# Patient Record
Sex: FEMALE | Race: BLACK OR AFRICAN AMERICAN | NOT HISPANIC OR LATINO | Employment: UNEMPLOYED | URBAN - METROPOLITAN AREA
[De-identification: names, ages, dates, MRNs, and addresses within clinical notes are randomized per-mention and may not be internally consistent; named-entity substitution may affect disease eponyms.]

---

## 2024-08-09 ENCOUNTER — APPOINTMENT (EMERGENCY)
Dept: CT IMAGING | Facility: HOSPITAL | Age: 64
DRG: 389 | End: 2024-08-09
Payer: COMMERCIAL

## 2024-08-09 ENCOUNTER — HOSPITAL ENCOUNTER (INPATIENT)
Facility: HOSPITAL | Age: 64
LOS: 3 days | Discharge: HOME | DRG: 389 | End: 2024-08-13
Attending: EMERGENCY MEDICINE | Admitting: FAMILY MEDICINE
Payer: COMMERCIAL

## 2024-08-09 DIAGNOSIS — K56.609 SBO (SMALL BOWEL OBSTRUCTION) (HCC): ICD-10-CM

## 2024-08-09 DIAGNOSIS — R11.2 NAUSEA & VOMITING: Primary | ICD-10-CM

## 2024-08-09 DIAGNOSIS — R10.9 ABDOMINAL PAIN: ICD-10-CM

## 2024-08-09 DIAGNOSIS — K56.609 SMALL BOWEL OBSTRUCTION (HCC): ICD-10-CM

## 2024-08-09 LAB
ALBUMIN SERPL BCG-MCNC: 3.7 G/DL (ref 3.5–5)
ALP SERPL-CCNC: 106 U/L (ref 34–104)
ALT SERPL W P-5'-P-CCNC: 21 U/L (ref 7–52)
ANION GAP SERPL CALCULATED.3IONS-SCNC: 13 MMOL/L (ref 4–13)
AST SERPL W P-5'-P-CCNC: 26 U/L (ref 13–39)
BASOPHILS # BLD AUTO: 0.03 THOUSANDS/ÂΜL (ref 0–0.1)
BASOPHILS NFR BLD AUTO: 0 % (ref 0–1)
BILIRUB SERPL-MCNC: 1.48 MG/DL (ref 0.2–1)
BUN SERPL-MCNC: 19 MG/DL (ref 5–25)
CALCIUM SERPL-MCNC: 8.9 MG/DL (ref 8.4–10.2)
CHLORIDE SERPL-SCNC: 102 MMOL/L (ref 96–108)
CO2 SERPL-SCNC: 22 MMOL/L (ref 21–32)
CREAT SERPL-MCNC: 1.11 MG/DL (ref 0.6–1.3)
EOSINOPHIL # BLD AUTO: 0 THOUSAND/ÂΜL (ref 0–0.61)
EOSINOPHIL NFR BLD AUTO: 0 % (ref 0–6)
ERYTHROCYTE [DISTWIDTH] IN BLOOD BY AUTOMATED COUNT: 12.6 % (ref 11.6–15.1)
FLUAV RNA RESP QL NAA+PROBE: NEGATIVE
FLUBV RNA RESP QL NAA+PROBE: NEGATIVE
GFR SERPL CREATININE-BSD FRML MDRD: 52 ML/MIN/1.73SQ M
GLUCOSE SERPL-MCNC: 177 MG/DL (ref 65–140)
HCT VFR BLD AUTO: 52.1 % (ref 34.8–46.1)
HGB BLD-MCNC: 17.2 G/DL (ref 11.5–15.4)
IMM GRANULOCYTES # BLD AUTO: 0.05 THOUSAND/UL (ref 0–0.2)
IMM GRANULOCYTES NFR BLD AUTO: 0 % (ref 0–2)
LACTATE SERPL-SCNC: 1.8 MMOL/L (ref 0.5–2)
LIPASE SERPL-CCNC: 8 U/L (ref 11–82)
LYMPHOCYTES # BLD AUTO: 0.64 THOUSANDS/ÂΜL (ref 0.6–4.47)
LYMPHOCYTES NFR BLD AUTO: 6 % (ref 14–44)
MCH RBC QN AUTO: 30.6 PG (ref 26.8–34.3)
MCHC RBC AUTO-ENTMCNC: 33 G/DL (ref 31.4–37.4)
MCV RBC AUTO: 93 FL (ref 82–98)
MONOCYTES # BLD AUTO: 0.64 THOUSAND/ÂΜL (ref 0.17–1.22)
MONOCYTES NFR BLD AUTO: 6 % (ref 4–12)
NEUTROPHILS # BLD AUTO: 10.24 THOUSANDS/ÂΜL (ref 1.85–7.62)
NEUTS SEG NFR BLD AUTO: 88 % (ref 43–75)
NRBC BLD AUTO-RTO: 0 /100 WBCS
PLATELET # BLD AUTO: 281 THOUSANDS/UL (ref 149–390)
PMV BLD AUTO: 10 FL (ref 8.9–12.7)
POTASSIUM SERPL-SCNC: 4.5 MMOL/L (ref 3.5–5.3)
PROT SERPL-MCNC: 7.9 G/DL (ref 6.4–8.4)
RBC # BLD AUTO: 5.63 MILLION/UL (ref 3.81–5.12)
RSV RNA RESP QL NAA+PROBE: NEGATIVE
SARS-COV-2 RNA RESP QL NAA+PROBE: NEGATIVE
SODIUM SERPL-SCNC: 137 MMOL/L (ref 135–147)
WBC # BLD AUTO: 11.6 THOUSAND/UL (ref 4.31–10.16)

## 2024-08-09 PROCEDURE — 99285 EMERGENCY DEPT VISIT HI MDM: CPT

## 2024-08-09 PROCEDURE — 93005 ELECTROCARDIOGRAM TRACING: CPT

## 2024-08-09 PROCEDURE — 83605 ASSAY OF LACTIC ACID: CPT

## 2024-08-09 PROCEDURE — 96361 HYDRATE IV INFUSION ADD-ON: CPT

## 2024-08-09 PROCEDURE — 80053 COMPREHEN METABOLIC PANEL: CPT | Performed by: EMERGENCY MEDICINE

## 2024-08-09 PROCEDURE — 0241U HB NFCT DS VIR RESP RNA 4 TRGT: CPT

## 2024-08-09 PROCEDURE — 83690 ASSAY OF LIPASE: CPT | Performed by: EMERGENCY MEDICINE

## 2024-08-09 PROCEDURE — 85025 COMPLETE CBC W/AUTO DIFF WBC: CPT | Performed by: EMERGENCY MEDICINE

## 2024-08-09 PROCEDURE — 96376 TX/PRO/DX INJ SAME DRUG ADON: CPT

## 2024-08-09 PROCEDURE — 74177 CT ABD & PELVIS W/CONTRAST: CPT

## 2024-08-09 PROCEDURE — 96374 THER/PROPH/DIAG INJ IV PUSH: CPT

## 2024-08-09 PROCEDURE — 36415 COLL VENOUS BLD VENIPUNCTURE: CPT | Performed by: EMERGENCY MEDICINE

## 2024-08-09 PROCEDURE — 96375 TX/PRO/DX INJ NEW DRUG ADDON: CPT

## 2024-08-09 RX ORDER — ONDANSETRON 2 MG/ML
4 INJECTION INTRAMUSCULAR; INTRAVENOUS ONCE
Status: COMPLETED | OUTPATIENT
Start: 2024-08-09 | End: 2024-08-09

## 2024-08-09 RX ORDER — MORPHINE SULFATE 4 MG/ML
4 INJECTION, SOLUTION INTRAMUSCULAR; INTRAVENOUS ONCE
Status: COMPLETED | OUTPATIENT
Start: 2024-08-09 | End: 2024-08-09

## 2024-08-09 RX ADMIN — MORPHINE SULFATE 4 MG: 4 INJECTION INTRAVENOUS at 23:41

## 2024-08-09 RX ADMIN — SODIUM CHLORIDE 1000 ML: 0.9 INJECTION, SOLUTION INTRAVENOUS at 21:15

## 2024-08-09 RX ADMIN — ONDANSETRON 4 MG: 2 INJECTION INTRAMUSCULAR; INTRAVENOUS at 23:42

## 2024-08-09 RX ADMIN — ONDANSETRON 4 MG: 2 INJECTION INTRAMUSCULAR; INTRAVENOUS at 21:15

## 2024-08-09 RX ADMIN — IOHEXOL 100 ML: 350 INJECTION, SOLUTION INTRAVENOUS at 22:00

## 2024-08-10 PROBLEM — Z86.711 HISTORY OF PULMONARY EMBOLUS (PE): Status: ACTIVE | Noted: 2024-08-10

## 2024-08-10 PROBLEM — K56.609 SBO (SMALL BOWEL OBSTRUCTION) (HCC): Status: ACTIVE | Noted: 2024-08-10

## 2024-08-10 PROBLEM — I10 PRIMARY HYPERTENSION: Status: ACTIVE | Noted: 2024-08-10

## 2024-08-10 LAB
ALBUMIN SERPL BCG-MCNC: 3.1 G/DL (ref 3.5–5)
ALP SERPL-CCNC: 79 U/L (ref 34–104)
ALT SERPL W P-5'-P-CCNC: 15 U/L (ref 7–52)
ANION GAP SERPL CALCULATED.3IONS-SCNC: 8 MMOL/L (ref 4–13)
AST SERPL W P-5'-P-CCNC: 18 U/L (ref 13–39)
ATRIAL RATE: 101 BPM
BACTERIA UR QL AUTO: ABNORMAL /HPF
BILIRUB SERPL-MCNC: 1.2 MG/DL (ref 0.2–1)
BILIRUB UR QL STRIP: NEGATIVE
BUN SERPL-MCNC: 18 MG/DL (ref 5–25)
CALCIUM ALBUM COR SERPL-MCNC: 8.4 MG/DL (ref 8.3–10.1)
CALCIUM SERPL-MCNC: 7.7 MG/DL (ref 8.4–10.2)
CHLORIDE SERPL-SCNC: 107 MMOL/L (ref 96–108)
CLARITY UR: CLEAR
CO2 SERPL-SCNC: 24 MMOL/L (ref 21–32)
COLOR UR: ABNORMAL
CREAT SERPL-MCNC: 0.97 MG/DL (ref 0.6–1.3)
ERYTHROCYTE [DISTWIDTH] IN BLOOD BY AUTOMATED COUNT: 12.7 % (ref 11.6–15.1)
GFR SERPL CREATININE-BSD FRML MDRD: 62 ML/MIN/1.73SQ M
GLUCOSE SERPL-MCNC: 137 MG/DL (ref 65–140)
GLUCOSE UR STRIP-MCNC: NEGATIVE MG/DL
HCT VFR BLD AUTO: 44.6 % (ref 34.8–46.1)
HGB BLD-MCNC: 14 G/DL (ref 11.5–15.4)
HGB UR QL STRIP.AUTO: NEGATIVE
HYALINE CASTS #/AREA URNS LPF: ABNORMAL /LPF
KETONES UR STRIP-MCNC: ABNORMAL MG/DL
LEUKOCYTE ESTERASE UR QL STRIP: NEGATIVE
MAGNESIUM SERPL-MCNC: 1.8 MG/DL (ref 1.9–2.7)
MCH RBC QN AUTO: 30 PG (ref 26.8–34.3)
MCHC RBC AUTO-ENTMCNC: 31.4 G/DL (ref 31.4–37.4)
MCV RBC AUTO: 96 FL (ref 82–98)
MUCOUS THREADS UR QL AUTO: ABNORMAL
NITRITE UR QL STRIP: NEGATIVE
NON-SQ EPI CELLS URNS QL MICRO: ABNORMAL /HPF
P AXIS: 42 DEGREES
PH UR STRIP.AUTO: 6 [PH]
PHOSPHATE SERPL-MCNC: 3.4 MG/DL (ref 2.3–4.1)
PLATELET # BLD AUTO: 240 THOUSANDS/UL (ref 149–390)
PMV BLD AUTO: 10.5 FL (ref 8.9–12.7)
POTASSIUM SERPL-SCNC: 3.8 MMOL/L (ref 3.5–5.3)
PR INTERVAL: 156 MS
PROT SERPL-MCNC: 6.2 G/DL (ref 6.4–8.4)
PROT UR STRIP-MCNC: ABNORMAL MG/DL
QRS AXIS: -39 DEGREES
QRSD INTERVAL: 84 MS
QT INTERVAL: 370 MS
QTC INTERVAL: 479 MS
RBC # BLD AUTO: 4.67 MILLION/UL (ref 3.81–5.12)
RBC #/AREA URNS AUTO: ABNORMAL /HPF
SODIUM SERPL-SCNC: 139 MMOL/L (ref 135–147)
SP GR UR STRIP.AUTO: >=1.05 (ref 1–1.03)
T WAVE AXIS: 10 DEGREES
UROBILINOGEN UR STRIP-ACNC: <2 MG/DL
VENTRICULAR RATE: 101 BPM
WBC # BLD AUTO: 8.28 THOUSAND/UL (ref 4.31–10.16)
WBC #/AREA URNS AUTO: ABNORMAL /HPF

## 2024-08-10 PROCEDURE — 85027 COMPLETE CBC AUTOMATED: CPT | Performed by: FAMILY MEDICINE

## 2024-08-10 PROCEDURE — 83735 ASSAY OF MAGNESIUM: CPT | Performed by: FAMILY MEDICINE

## 2024-08-10 PROCEDURE — 81001 URINALYSIS AUTO W/SCOPE: CPT | Performed by: FAMILY MEDICINE

## 2024-08-10 PROCEDURE — 93010 ELECTROCARDIOGRAM REPORT: CPT | Performed by: INTERNAL MEDICINE

## 2024-08-10 PROCEDURE — 99222 1ST HOSP IP/OBS MODERATE 55: CPT | Performed by: FAMILY MEDICINE

## 2024-08-10 PROCEDURE — 99255 IP/OBS CONSLTJ NEW/EST HI 80: CPT | Performed by: SURGERY

## 2024-08-10 PROCEDURE — 84100 ASSAY OF PHOSPHORUS: CPT | Performed by: FAMILY MEDICINE

## 2024-08-10 PROCEDURE — 80053 COMPREHEN METABOLIC PANEL: CPT | Performed by: FAMILY MEDICINE

## 2024-08-10 RX ORDER — SODIUM CHLORIDE, SODIUM GLUCONATE, SODIUM ACETATE, POTASSIUM CHLORIDE, MAGNESIUM CHLORIDE, SODIUM PHOSPHATE, DIBASIC, AND POTASSIUM PHOSPHATE .53; .5; .37; .037; .03; .012; .00082 G/100ML; G/100ML; G/100ML; G/100ML; G/100ML; G/100ML; G/100ML
125 INJECTION, SOLUTION INTRAVENOUS CONTINUOUS
Status: DISCONTINUED | OUTPATIENT
Start: 2024-08-10 | End: 2024-08-12

## 2024-08-10 RX ORDER — MAGNESIUM SULFATE 1 G/100ML
1 INJECTION INTRAVENOUS ONCE
Status: COMPLETED | OUTPATIENT
Start: 2024-08-10 | End: 2024-08-10

## 2024-08-10 RX ORDER — HEPARIN SODIUM 5000 [USP'U]/ML
5000 INJECTION, SOLUTION INTRAVENOUS; SUBCUTANEOUS EVERY 8 HOURS SCHEDULED
Status: DISCONTINUED | OUTPATIENT
Start: 2024-08-10 | End: 2024-08-13 | Stop reason: HOSPADM

## 2024-08-10 RX ORDER — WARFARIN SODIUM 2.5 MG/1
7.5 TABLET ORAL
COMMUNITY
End: 2024-08-10 | Stop reason: ALTCHOICE

## 2024-08-10 RX ORDER — ONDANSETRON 2 MG/ML
4 INJECTION INTRAMUSCULAR; INTRAVENOUS EVERY 6 HOURS PRN
Status: DISCONTINUED | OUTPATIENT
Start: 2024-08-10 | End: 2024-08-13 | Stop reason: HOSPADM

## 2024-08-10 RX ORDER — MORPHINE SULFATE 4 MG/ML
4 INJECTION, SOLUTION INTRAMUSCULAR; INTRAVENOUS EVERY 4 HOURS PRN
Status: DISCONTINUED | OUTPATIENT
Start: 2024-08-10 | End: 2024-08-11

## 2024-08-10 RX ORDER — METOPROLOL TARTRATE 1 MG/ML
5 INJECTION, SOLUTION INTRAVENOUS EVERY 12 HOURS
Status: DISCONTINUED | OUTPATIENT
Start: 2024-08-10 | End: 2024-08-12

## 2024-08-10 RX ORDER — METOPROLOL TARTRATE 25 MG/1
12.5 TABLET, FILM COATED ORAL 2 TIMES DAILY
COMMUNITY

## 2024-08-10 RX ADMIN — HEPARIN SODIUM 5000 UNITS: 5000 INJECTION INTRAVENOUS; SUBCUTANEOUS at 18:12

## 2024-08-10 RX ADMIN — SODIUM CHLORIDE, SODIUM GLUCONATE, SODIUM ACETATE, POTASSIUM CHLORIDE AND MAGNESIUM CHLORIDE 125 ML/HR: 526; 502; 368; 37; 30 INJECTION, SOLUTION INTRAVENOUS at 02:11

## 2024-08-10 RX ADMIN — SODIUM CHLORIDE, SODIUM GLUCONATE, SODIUM ACETATE, POTASSIUM CHLORIDE AND MAGNESIUM CHLORIDE 125 ML/HR: 526; 502; 368; 37; 30 INJECTION, SOLUTION INTRAVENOUS at 09:44

## 2024-08-10 RX ADMIN — MAGNESIUM SULFATE HEPTAHYDRATE 1 G: 1 INJECTION, SOLUTION INTRAVENOUS at 09:40

## 2024-08-10 RX ADMIN — METOROPROLOL TARTRATE 5 MG: 5 INJECTION, SOLUTION INTRAVENOUS at 12:21

## 2024-08-10 RX ADMIN — HEPARIN SODIUM 5000 UNITS: 5000 INJECTION INTRAVENOUS; SUBCUTANEOUS at 02:11

## 2024-08-10 RX ADMIN — METOROPROLOL TARTRATE 5 MG: 5 INJECTION, SOLUTION INTRAVENOUS at 01:15

## 2024-08-10 RX ADMIN — SODIUM CHLORIDE 1000 ML: 0.9 INJECTION, SOLUTION INTRAVENOUS at 00:33

## 2024-08-10 RX ADMIN — MORPHINE SULFATE 4 MG: 4 INJECTION INTRAVENOUS at 04:20

## 2024-08-10 RX ADMIN — HEPARIN SODIUM 5000 UNITS: 5000 INJECTION INTRAVENOUS; SUBCUTANEOUS at 09:48

## 2024-08-10 NOTE — ASSESSMENT & PLAN NOTE
S/p surgery    From 22 from Gyn Oncology  62 y.o.  female who was diagnosed with a Stage IIC left ovarian carcinosarcoma s/p ELAP, modified posterior exenteration and tumor debulking along left pelvic sidewall, bladder dome, peritoneal stripping, left pelvic lymph node dissection and debulking, radical omentectomy, appy, loop ileostomy with Jocelyne Sweeney and Linnea on 2015.     The patient received only 3 cycles of combination of carboplatin and paclitaxel, which was completed on 2015. She was lost to follow up and had her ileostomy reversed in 2016.

## 2024-08-10 NOTE — TREATMENT TEAM
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 emergency room with complaining of abdominal pain, nausea, vomiting that started Saturday morning.  Patient reports that she started having some abdominal discomfort after having supper on Friday.  Reports that she had 25+ episodes of nonbloody emesis since Saturday.  On further evaluation noted to have finding concerning of small bowel pressure on CT scan and patient could not tolerate NG tube attempts x 2 in the ER.  Currently on my encounter patient appears comfortable and not in distress.  Currently patient is still reporting episodes of nausea without vomiting.  Does report last bowel movement on Saturday.  Does report passing flatus.  Currently she denies any other new complaints.    CBC within normal limits.  CMP noted with elevated T. bili 1.20, magnesium 1.8.  Lactic acid normal.  UA negative for UTI.  CT abdomen pelvis reported with multiple abnormally dilated loops, finding concerning of small bowel position likely high-grade.    Obese female patient, acutely nontoxic appearing.  She is awake, alert, oriented x 3.  Normal S1-S2, no JVD.  Lung sounds without wheezing or crackles.  Hyperactive bowel sounds.  Abdomen distended, mild diffuse tenderness noted on exam without rigidity.    Currently she is acutely nontoxic-appearing.  Hemodynamically stable.  Continue n.p.o., bowel rest.  Continue with IV fluids, IV antiemetics as needed, analgesics as needed with cautions.  Encourage ambulation and incentive spirometry use.  Discussed with patient at length that she may need NG tube if symptoms worsens.  Awaiting further recommendation from surgery team.  Supplement electrolytes as needed.

## 2024-08-10 NOTE — ASSESSMENT & PLAN NOTE
CT scan showing sbo, likely high grade    Consult General Surgery  NPO  IV Fluids   Labs in AM  NG tube

## 2024-08-10 NOTE — H&P
[facility]  H&P  Name: Angelita Lam 64 y.o. female I MRN: [id number]  Unit/Bed#: ED 14 I Date of Admission: 2024   Date of Service: 8/10/2024 I Hospital Day: 0      Assessment & Plan   * SBO (small bowel obstruction) (HCC)  Assessment & Plan  CT scan showing sbo, likely high grade    Consult General Surgery  NPO  IV Fluids   Labs in AM  NG tube    Malignant neoplasm of left ovary (HCC)  Assessment & Plan  S/p surgery    From 22 from Gyn Oncology  62 y.o.  female who was diagnosed with a Stage IIC left ovarian carcinosarcoma s/p ELAP, modified posterior exenteration and tumor debulking along left pelvic sidewall, bladder dome, peritoneal stripping, left pelvic lymph node dissection and debulking, radical omentectomy, appy, loop ileostomy with Jocelyne Sweeney and Linnea on 2015.     The patient received only 3 cycles of combination of carboplatin and paclitaxel, which was completed on 2015. She was lost to follow up and had her ileostomy reversed in 2016.     Primary hypertension  Assessment & Plan  Home regimen is Lopressor 12.5 mg p.o. twice daily, which will switch over to IV twice daily           Disposition  #1  Consult general surgery  #2  N.p.o. and NG tube  #3  IV pain medication  #4  Labs in a.m.        VTE Prophylaxis: Heparin  / sequential compression device   Code Status: Level 1 - Full Code       Anticipated Length of Stay:  Patient will be admitted on an Inpatient basis with an anticipated length of stay of greater than 2 midnights.   Justification for Hospital Stay: Please see detailed plans noted above.    Chief Complaint:     Abdominal pain  History of Present Illness:  Angelita Lam is a 64 y.o. female who has past medical history significant for ovarian cancer status post surgery back in , hypertension comes in for nausea vomiting and onset of abdominal pain that happened yesterday.  The pain continued getting worse and continued having  worsening burping episodes.  She is not passing any flatus.  She was urged by her significant other to come to the hospital.  Every time she tried to eat or drink she would throw up.      Review of Systems:    Constitutional:  Denies fever or chills   Eyes:  Denies change in visual acuity   HENT:  Denies nasal congestion or sore throat   Respiratory:  Denies cough or shortness of breath   Cardiovascular:  Denies chest pain or edema   GI: Nausea, vomiting, abdominal pain  :  Denies dysuria   Musculoskeletal:  Denies back pain or joint pain   Integument:  Denies rash   Neurologic:  Denies headache or sensory changes   Endocrine:  Denies polyuria or polydipsia   Lymphatic:  Denies swollen glands   Psychiatric:  Denies depression or anxiety     Past Medical and Surgical History:   Past Medical History:   Diagnosis Date    Ovarian cancer (HCC)      History reviewed. No pertinent surgical history.    Meds/Allergies:  No current facility-administered medications on file prior to encounter.     Current Outpatient Medications on File Prior to Encounter   Medication Sig Dispense Refill    metoprolol tartrate (LOPRESSOR) 25 mg tablet Take 12.5 mg by mouth 2 (two) times a day      [DISCONTINUED] warfarin (COUMADIN) 2.5 mg tablet Take 7.5 mg by mouth daily             Allergies:   Allergies   Allergen Reactions    Bee Venom Other (See Comments)     fever, spasms, pain       History:  Marital Status: /Civil Union     Substance Use History:   Social History     Substance and Sexual Activity   Alcohol Use Not Currently     Social History     Tobacco Use   Smoking Status Never   Smokeless Tobacco Never     Social History     Substance and Sexual Activity   Drug Use Not Currently       Family History:  History reviewed. No pertinent family history.    Physical Exam:     Vitals:   Blood Pressure: (!) 140/101 (Simultaneous filing. User may not have seen previous data.) (08/10/24 0035)  Pulse: 105 (Simultaneous filing. User may  not have seen previous data.) (08/10/24 0035)  Temperature: 98.3 °F (36.8 °C) (08/09/24 2045)  Temp Source: Oral (08/09/24 2045)  Respirations: 16 (Simultaneous filing. User may not have seen previous data.) (08/10/24 0035)  Weight - Scale: 90.7 kg (200 lb) (08/09/24 2046)  SpO2: 95 % (Simultaneous filing. User may not have seen previous data.) (08/10/24 0035)    Constitutional:  Non-toxic appearance  Eyes:  EOMI, No scleral icterus   HENT:   oropharynx moist, external ears normal, external nose normal   Respiratory:  No respiratory distress, no wheezing   Cardiovascular: Tachycardic  GI:  Soft, distended, some tenderness upon palpation  :  No costovertebral angle tenderness   Musculoskeletal:  no tenderness, no deformities.   Integument:  no jaundice, no rash   Neurologic:  Alert &awake, communicative, CN 2-12 normal,  no focal deficits noted   Psychiatric:  Speech and behavior appropriate       Lab Results: I have personally reviewed pertinent reports.      Results from last 7 days   Lab Units 08/09/24  2114   WBC Thousand/uL 11.60*   HEMOGLOBIN g/dL 17.2*   HEMATOCRIT % 52.1*   PLATELETS Thousands/uL 281   SEGS PCT % 88*   LYMPHO PCT % 6*   MONO PCT % 6   EOS PCT % 0     Results from last 7 days   Lab Units 08/09/24  2114   POTASSIUM mmol/L 4.5   CHLORIDE mmol/L 102   CO2 mmol/L 22   BUN mg/dL 19   CREATININE mg/dL 1.11   CALCIUM mg/dL 8.9   ALK PHOS U/L 106*   ALT U/L 21   AST U/L 26               Imaging: I have personally reviewed pertinent reports.      CT abdomen pelvis with contrast    Result Date: 8/9/2024  Narrative: CT ABDOMEN AND PELVIS WITH IV CONTRAST INDICATION: abdominal pain and vomiting. COMPARISON: None. TECHNIQUE: CT examination of the abdomen and pelvis was performed. Multiplanar 2D reformatted images were created from the source data. This examination, like all CT scans performed in the Critical access hospital, was performed utilizing techniques to minimize radiation dose exposure,  including the use of iterative reconstruction and automated exposure control. Radiation dose length product (DLP) for this visit: 731 mGy-cm IV Contrast: 100 mL of iohexol (OMNIPAQUE) Enteric Contrast: Not administered. FINDINGS: ABDOMEN LOWER CHEST: There is trace right pleural fluid. There is a small hiatal hernia. LIVER/BILIARY TREE: Hepatic steatosis. No suspicious mass. Normal hepatic contours. No biliary dilation. GALLBLADDER: No calcified gallstones. No pericholecystic inflammatory change. SPLEEN: Unremarkable. PANCREAS: Unremarkable. ADRENAL GLANDS: Unremarkable. KIDNEYS/URETERS: No hydronephrosis or urinary tract calculi. Subcentimeter hypoattenuating renal lesion(s), too small to characterize but statistically likely benign, which do not warrant follow-up (Radiology June 2019). STOMACH AND BOWEL: Anastomotic suture material is noted at the level of the rectosigmoid junction. There are multiple loops of abnormally dilated loops of proximal to mid small bowel with relative collapse of the distal small bowel and colon. A few loops of small bowel within the lower abdomen and pelvis demonstrate fecalization of its intraluminal contents as well as circumferential wall thickening. The findings are most compatible with a small bowel obstruction, likely high-grade. A transition point appears to be within the left lower quadrant/pelvic region. APPENDIX: No findings to suggest appendicitis. ABDOMINOPELVIC CAVITY: There is a small to moderate amount of intra-abdominal ascites. No pneumoperitoneum. No lymphadenopathy. VESSELS: Unremarkable for patient's age. An infrarenal IVC filter is noted. PELVIS REPRODUCTIVE ORGANS: Unremarkable for patient's age. URINARY BLADDER: There is mild diffuse urinary bladder wall thickening with surrounding phonatory stranding suspicious for an acute cystitis/UTI. ABDOMINAL WALL/INGUINAL REGIONS: Unremarkable. BONES: No acute fracture or suspicious osseous lesion. Spinal degenerative  changes.     Impression: Multiple abnormally dilated loops of proximal to mid small bowel with relative collapse of the distal small bowel compatible with a small bowel obstruction, likely high-grade. A few of these loops dilated small bowel demonstrate circumferential wall thickening with surrounding mesenteric edema for which early ischemia cannot be excluded. Small to moderate amount of intra-abdominal ascites is also present. Surgical consultation is advised. No free intraperitoneal air. Mild perivesicular inflammatory stranding suspicious for an acute cystitis/UTI. Correlation with a urinalysis is recommended. I personally discussed this study with CRYS RAMIREZ on 8/9/2024 10:21 PM. Workstation performed: [id number]         Medical decision making: Moderate  Diagnosis addressed: 1 acute complicated medical problem  Data:   Reviewed  CBC, CMP, lactic acid, lipase, viral panel  Ordered CBC, BMP, magnesium and phosphorus  Reviewed external notes from gynecology oncology  Independent interpretation of testing EKG/telemetry: Interpreted myself which shows sinus tachycardia  Discussion of management with ER provider: NG tube, IV fluids n.p.o.           Risk:  Prescription drug management: IV fluids, IV pain medication  Discussion for hospitalization with ER provider: Requires hospitalization for small bowel structure and possibly needing surgery                     Epic Records Reviewed as well as Records in Care Everywhere    ** Please Note: Dragon 360 Dictation voice to text software was used in the creation of this document. **

## 2024-08-10 NOTE — ED PROVIDER NOTES
History  Chief Complaint   Patient presents with    Vomiting     Pt got sick last night after dinner and hasn't been able to stop vomiting since then. Pt states she never checked her temperature but she had hot and cold flashes all night      The patient is a 64 y.o. female with a history of ovarian cancer who presents to [facility] with a chief complaint of vomiting. Symptoms began last night and have been constant since onset.  Her pain is currently rated as a 4/10 in severity and described as discomfort without radiation. Associated symptoms include chills. Symptoms are aggravated with oral intake and alleviating factors include none noted. The patient denies fever, chest pain, cough, sputum, wheezing, dizziness, headedness, syncope, seizures, falls, trauma, hemoptysis, hematemesis, hematochezia, diarrhea, melena, dysuria, hematuria, frequency or urgency. No other reported symptoms at this time.  Patient affirms allergies to bee venom          History provided by:  Patient   used: No    Vomiting  Associated symptoms: abdominal pain and chills    Associated symptoms: no arthralgias, no cough, no fever and no sore throat        Prior to Admission Medications   Prescriptions Last Dose Informant Patient Reported? Taking?   metoprolol tartrate (LOPRESSOR) 25 mg tablet Past Week  Yes Yes   Sig: Take 12.5 mg by mouth 2 (two) times a day      Facility-Administered Medications: None       Past Medical History:   Diagnosis Date    Ovarian cancer (HCC)        History reviewed. No pertinent surgical history.    History reviewed. No pertinent family history.  I have reviewed and agree with the history as documented.    E-Cigarette/Vaping     E-Cigarette/Vaping Substances     Social History     Tobacco Use    Smoking status: Never    Smokeless tobacco: Never   Substance Use Topics    Alcohol use: Not Currently    Drug use: Not Currently       Review of Systems   Constitutional:  Positive  for chills. Negative for fever.   HENT:  Negative for ear pain and sore throat.    Eyes:  Negative for pain and visual disturbance.   Respiratory:  Negative for cough and shortness of breath.    Cardiovascular:  Negative for chest pain and palpitations.   Gastrointestinal:  Positive for abdominal pain and vomiting.   Genitourinary:  Negative for dysuria and hematuria.   Musculoskeletal:  Negative for arthralgias and back pain.   Skin:  Negative for color change and rash.   Neurological:  Negative for seizures and syncope.   All other systems reviewed and are negative.      Physical Exam  Physical Exam  Vitals reviewed.   Constitutional:       General: She is not in acute distress.     Appearance: She is not ill-appearing.   HENT:      Head: Normocephalic and atraumatic.      Nose: Nose normal.      Mouth/Throat:      Mouth: Mucous membranes are moist.      Pharynx: Oropharynx is clear. No oropharyngeal exudate.   Eyes:      General: No scleral icterus.     Conjunctiva/sclera: Conjunctivae normal.   Cardiovascular:      Rate and Rhythm: Tachycardia present.      Pulses: Normal pulses.   Pulmonary:      Effort: Pulmonary effort is normal. No respiratory distress.      Breath sounds: Normal breath sounds. No stridor. No wheezing, rhonchi or rales.   Abdominal:      General: Abdomen is flat. Bowel sounds are normal.      Palpations: Abdomen is soft.      Tenderness: There is abdominal tenderness. There is no right CVA tenderness, left CVA tenderness, guarding or rebound.   Musculoskeletal:         General: No deformity or signs of injury. Normal range of motion.      Cervical back: Normal range of motion and neck supple.      Right lower leg: No edema.      Left lower leg: No edema.   Skin:     General: Skin is warm and dry.      Capillary Refill: Capillary refill takes less than 2 seconds.      Coloration: Skin is not jaundiced or pale.      Findings: No bruising, erythema or lesion.   Neurological:      Mental Status:  She is alert and oriented to person, place, and time. Mental status is at baseline.         Vital Signs  ED Triage Vitals   Temperature Pulse Respirations Blood Pressure SpO2   08/09/24 2045 08/09/24 2045 08/09/24 2045 08/09/24 2045 08/09/24 2045   98.3 °F (36.8 °C) (!) 128 20 166/95 98 %      Temp Source Heart Rate Source Patient Position - Orthostatic VS BP Location FiO2 (%)   08/09/24 2045 08/09/24 2045 08/09/24 2045 08/09/24 2045 --   Oral Monitor Sitting Left arm       Pain Score       08/09/24 2341       10 - Worst Possible Pain           Vitals:    08/10/24 0035 08/10/24 0114 08/10/24 0135 08/10/24 0139   BP: (!) 140/101 163/89 150/96    Pulse: 105 96  85   Patient Position - Orthostatic VS: Lying Sitting  Lying         Visual Acuity      ED Medications  Medications   multi-electrolyte (PLASMALYTE-A/ISOLYTE-S PH 7.4) IV solution (125 mL/hr Intravenous New Bag 8/10/24 0211)   ondansetron (ZOFRAN) injection 4 mg (has no administration in time range)   heparin (porcine) subcutaneous injection 5,000 Units (5,000 Units Subcutaneous Given 8/10/24 0211)   morphine injection 2 mg (has no administration in time range)   morphine injection 4 mg (4 mg Intravenous Given 8/10/24 0420)   metoprolol (LOPRESSOR) injection 5 mg (5 mg Intravenous Given 8/10/24 0115)   ondansetron (ZOFRAN) injection 4 mg (4 mg Intravenous Given 8/9/24 2115)   sodium chloride 0.9 % bolus 1,000 mL (0 mL Intravenous Stopped 8/9/24 2235)   iohexol (OMNIPAQUE) 350 MG/ML injection (MULTI-DOSE) 100 mL (100 mL Intravenous Given 8/9/24 2200)   ondansetron (ZOFRAN) injection 4 mg (4 mg Intravenous Given 8/9/24 2342)   morphine injection 4 mg (4 mg Intravenous Given 8/9/24 2341)   sodium chloride 0.9 % bolus 1,000 mL (1,000 mL Intravenous New Bag 8/10/24 0033)       Diagnostic Studies  Results Reviewed       Procedure Component Value Units Date/Time    UA w Reflex to Microscopic w Reflex to Culture [[id number]]  (Abnormal) Collected: 08/10/24 0334    Lab  Status: Final result Specimen: Urine, Other Updated: 08/10/24 0423     Color, UA Light Orange     Clarity, UA Clear     Specific Gravity, UA >=1.050     pH, UA 6.0     Leukocytes, UA Negative     Nitrite, UA Negative     Protein, UA 30 (1+) mg/dl      Glucose, UA Negative mg/dl      Ketones, UA Trace mg/dl      Urobilinogen, UA <2.0 mg/dl      Bilirubin, UA Negative     Occult Blood, UA Negative    Comprehensive metabolic panel [[id number]]     Lab Status: No result Specimen: Blood     Magnesium [[id number]]     Lab Status: No result Specimen: Blood     Phosphorus [[id number]]     Lab Status: No result Specimen: Blood     CBC (With Platelets) [[id number]]     Lab Status: No result Specimen: Blood     Lactic acid, plasma (w/reflex if result > 2.0) [[id number]]  (Normal) Collected: 08/09/24 2241    Lab Status: Final result Specimen: Blood from Arm, Left Updated: 08/09/24 2310     LACTIC ACID 1.8 mmol/L     Narrative:      Result may be elevated if tourniquet was used during collection.    FLU/RSV/COVID - if FLU/RSV clinically relevant [[id number]]  (Normal) Collected: 08/09/24 2151    Lab Status: Final result Specimen: Nares from Nose Updated: 08/09/24 2239     SARS-CoV-2 Negative     INFLUENZA A PCR Negative     INFLUENZA B PCR Negative     RSV PCR Negative    Narrative:      FOR PEDIATRIC PATIENTS - copy/paste COVID Guidelines URL to browser: [email or web address]    SARS-CoV-2 assay is a Nucleic Acid Amplification assay intended for the  qualitative detection of nucleic acid from SARS-CoV-2 in nasopharyngeal  swabs. Results are for the presumptive identification of SARS-CoV-2 RNA.    Positive results are indicative of infection with SARS-CoV-2, the virus  causing COVID-19, but do not rule out bacterial infection or co-infection  with other viruses. Laboratories within the [place] and its  territories are required to report all positive results to the  appropriate  public health authorities. Negative results do not preclude SARS-CoV-2  infection and should not be used as the sole basis for treatment or other  patient management decisions. Negative results must be combined with  clinical observations, patient history, and epidemiological information.  This test has not been FDA cleared or approved.    This test has been authorized by FDA under an Emergency Use Authorization  (EUA). This test is only authorized for the duration of time the  declaration that circumstances exist justifying the authorization of the  emergency use of an in vitro diagnostic tests for detection of SARS-CoV-2  virus and/or diagnosis of COVID-19 infection under section 564(b)(1) of  the Act, 21 U.S.C. 360bbb-3(b)(1), unless the authorization is terminated  or revoked sooner. The test has been validated but independent review by FDA  and CLIA is pending.    Test performed using Cepheid GeneXpert: This RT-PCR assay targets N2,  a region unique to SARS-CoV-2. A conserved region in the E-gene was chosen  for pan-Sarbecovirus detection which includes SARS-CoV-2.    According to CMS-2020-01-R, this platform meets the definition of high-throughput technology.    Comprehensive metabolic panel [[id number]]  (Abnormal) Collected: 08/09/24 2114    Lab Status: Final result Specimen: Blood from Arm, Left Updated: 08/09/24 2136     Sodium 137 mmol/L      Potassium 4.5 mmol/L      Chloride 102 mmol/L      CO2 22 mmol/L      ANION GAP 13 mmol/L      BUN 19 mg/dL      Creatinine 1.11 mg/dL      Glucose 177 mg/dL      Calcium 8.9 mg/dL      AST 26 U/L      ALT 21 U/L      Alkaline Phosphatase 106 U/L      Total Protein 7.9 g/dL      Albumin 3.7 g/dL      Total Bilirubin 1.48 mg/dL      eGFR 52 ml/min/1.73sq m     Narrative:      National Kidney Disease Foundation guidelines for Chronic Kidney Disease (CKD):     Stage 1 with normal or high GFR (GFR > 90 mL/min/1.73 square meters)    Stage 2 Mild CKD (GFR =  60-89 mL/min/1.73 square meters)    Stage 3A Moderate CKD (GFR = 45-59 mL/min/1.73 square meters)    Stage 3B Moderate CKD (GFR = 30-44 mL/min/1.73 square meters)    Stage 4 Severe CKD (GFR = 15-29 mL/min/1.73 square meters)    Stage 5 End Stage CKD (GFR <15 mL/min/1.73 square meters)  Note: GFR calculation is accurate only with a steady state creatinine    Lipase [[id number]]  (Abnormal) Collected: 08/09/24 2114    Lab Status: Final result Specimen: Blood from Arm, Left Updated: 08/09/24 2136     Lipase 8 u/L     CBC and differential [[id number]]  (Abnormal) Collected: 08/09/24 2114    Lab Status: Final result Specimen: Blood from Arm, Left Updated: 08/09/24 2122     WBC 11.60 Thousand/uL      RBC 5.63 Million/uL      Hemoglobin 17.2 g/dL      Hematocrit 52.1 %      MCV 93 fL      MCH 30.6 pg      MCHC 33.0 g/dL      RDW 12.6 %      MPV 10.0 fL      Platelets 281 Thousands/uL      nRBC 0 /100 WBCs      Segmented % 88 %      Immature Grans % 0 %      Lymphocytes % 6 %      Monocytes % 6 %      Eosinophils Relative 0 %      Basophils Relative 0 %      Absolute Neutrophils 10.24 Thousands/µL      Absolute Immature Grans 0.05 Thousand/uL      Absolute Lymphocytes 0.64 Thousands/µL      Absolute Monocytes 0.64 Thousand/µL      Eosinophils Absolute 0.00 Thousand/µL      Basophils Absolute 0.03 Thousands/µL                    CT abdomen pelvis with contrast   Final Result by Guy Mckinley MD (08/09 2236)      Multiple abnormally dilated loops of proximal to mid small bowel with relative collapse of the distal small bowel compatible with a small bowel obstruction, likely high-grade. A few of these loops dilated small bowel demonstrate circumferential wall    thickening with surrounding mesenteric edema for which early ischemia cannot be excluded. Small to moderate amount of intra-abdominal ascites is also present. Surgical consultation is advised.      No free intraperitoneal air.      Mild perivesicular inflammatory  stranding suspicious for an acute cystitis/UTI. Correlation with a urinalysis is recommended.               I personally discussed this study with CRYS RAMIREZ on 8/9/2024 10:21 PM.            Workstation performed: [id number]                    Procedures  Procedures         ED Course  ED Course as of 08/10/24 0425   Fri Aug 09, 2024   2239 CT abdomen pelvis with contrast  Multiple abnormally dilated loops of proximal to mid small bowel with relative collapse of the distal small bowel compatible with a small bowel obstruction, likely high-grade. A few of these loops dilated small bowel demonstrate circumferential wall   thickening with surrounding mesenteric edema for which early ischemia cannot be excluded. Small to moderate amount of intra-abdominal ascites is also present. Surgical consultation is advised.     No free intraperitoneal air.     Mild perivesicular inflammatory stranding suspicious for an acute cystitis/UTI. Correlation with a urinalysis is recommended.     2312 LACTIC ACID: 1.8                                 SBIRT 22yo+      Flowsheet Row Most Recent Value   Initial Alcohol Screen: US AUDIT-C     1. How often do you have a drink containing alcohol? 0 Filed at: 08/09/2024 2046   2. How many drinks containing alcohol do you have on a typical day you are drinking?  0 Filed at: 08/09/2024 2046   3b. FEMALE Any Age, or MALE 65+: How often do you have 4 or more drinks on one occassion? 0 Filed at: 08/09/2024 2046   Audit-C Score 0 Filed at: 08/09/2024 2046   WILLEM: How many times in the past year have you...    Used an illegal drug or used a prescription medication for non-medical reasons? Never Filed at: 08/09/2024 2046                      Medical Decision Making  The patient is a 64 y.o. female with a history of ovarian cancer who presents to [facility] with a chief complaint of vomiting.   Patient reports that the last meal she had before getting sick was chinese food. Patient  denies any sick contacts.  Abdominal exam without peritoneal signs. No evidence of acute abdomen at this time. Well appearing. Given work up, low suspicion for acute hepatobiliary disease (including acute cholecystitis or cholangitis), acute pancreatitis (neg lipase), PUD (including gastric perforation), acute infectious processes (pneumonia, hepatitis, pyelonephritis), acute appendicitis, vascular catastrophe, bowel obstruction, viscus perforation, or genital torsion, diverticulitis.   CT abdomen and pelvis showed Multiple abnormally dilated loops of proximal to mid small bowel with relative collapse of the distal small bowel compatible with a small bowel obstruction, likely high-grade. A few of these loops dilated small bowel demonstrate circumferential wall   thickening with surrounding mesenteric edema for which early ischemia cannot be excluded. Small to moderate amount of intra-abdominal ascites is also present. Surgical consultation is advised.  No free intraperitoneal air.  Mild perivesicular inflammatory stranding suspicious for an acute cystitis/UTI. Correlation with a urinalysis is recommended.  Discussed with surgery on call who recommended NPO, NG tube, and SLIM admission with surgery consult. Patient understands and agrees with     Problems Addressed:  Abdominal pain: acute illness or injury  Nausea & vomiting: acute illness or injury  Small bowel obstruction (HCC): acute illness or injury    Amount and/or Complexity of Data Reviewed  Labs: ordered. Decision-making details documented in ED Course.  Radiology: ordered. Decision-making details documented in ED Course.    Risk  Prescription drug management.  Decision regarding hospitalization.                 Disposition  Final diagnoses:   Nausea & vomiting   Small bowel obstruction (HCC)   Abdominal pain     Time reflects when diagnosis was documented in both MDM as applicable and the Disposition within this note       Time User Action Codes Description  Comment    8/9/2024 11:37 PM Josh Ocampo Add [R11.2] Nausea & vomiting     8/9/2024 11:37 PM Josh Ocampo Add [K56.609] Small bowel obstruction (HCC)     8/9/2024 11:38 PM Josh Ocampo Add [R10.9] Abdominal pain     8/10/2024  1:02 AM Osman Noel Add [K56.609] SBO (small bowel obstruction) (HCC)           ED Disposition       ED Disposition   Admit    Condition   Stable    Date/Time   Sat Aug 10, 2024 0018    Comment   Case was discussed with INDRA and the patient's admission status was agreed to be Admission Status: inpatient status to the service of Dr. Noel .               Follow-up Information    None         Current Discharge Medication List        CONTINUE these medications which have NOT CHANGED    Details   metoprolol tartrate (LOPRESSOR) 25 mg tablet Take 12.5 mg by mouth 2 (two) times a day             No discharge procedures on file.    PDMP Review       None            ED Provider  Electronically Signed by             Josh Ocampo PA-C  08/10/24 0425

## 2024-08-10 NOTE — ED NOTES
Attempted to insert NGT, pt. Was unable to tolerate procedure. ABIGAIL Moreno made aware.      Esther Colbert RN  08/10/24 0102

## 2024-08-10 NOTE — PLAN OF CARE
Problem: PAIN - ADULT  Goal: Verbalizes/displays adequate comfort level or baseline comfort level  Description: Interventions:  - Encourage patient to monitor pain and request assistance  - Assess pain using appropriate pain scale  - Administer analgesics based on type and severity of pain and evaluate response  - Implement non-pharmacological measures as appropriate and evaluate response  - Consider cultural and social influences on pain and pain management  - Notify physician/advanced practitioner if interventions unsuccessful or patient reports new pain  Outcome: Progressing     Problem: INFECTION - ADULT  Goal: Absence or prevention of progression during hospitalization  Description: INTERVENTIONS:  - Assess and monitor for signs and symptoms of infection  - Monitor lab/diagnostic results  - Monitor all insertion sites, i.e. indwelling lines, tubes, and drains  - Monitor endotracheal if appropriate and nasal secretions for changes in amount and color  - Kermit appropriate cooling/warming therapies per order  - Administer medications as ordered  - Instruct and encourage patient and family to use good hand hygiene technique  - Identify and instruct in appropriate isolation precautions for identified infection/condition  Outcome: Progressing  Goal: Absence of fever/infection during neutropenic period  Description: INTERVENTIONS:  - Monitor WBC    Outcome: Progressing     Problem: SAFETY ADULT  Goal: Patient will remain free of falls  Description: INTERVENTIONS:  - Educate patient/family on patient safety including physical limitations  - Instruct patient to call for assistance with activity   - Consult OT/PT to assist with strengthening/mobility   - Keep Call bell within reach  - Keep bed low and locked with side rails adjusted as appropriate  - Keep care items and personal belongings within reach  - Initiate and maintain comfort rounds  - Make Fall Risk Sign visible to staff  - Apply yellow socks and bracelet  for high fall risk patients  - Consider moving patient to room near nurses station  Outcome: Progressing  Goal: Maintain or return to baseline ADL function  Description: INTERVENTIONS:  -  Assess patient's ability to carry out ADLs; assess patient's baseline for ADL function and identify physical deficits which impact ability to perform ADLs (bathing, care of mouth/teeth, toileting, grooming, dressing, etc.)  - Assess/evaluate cause of self-care deficits   - Assess range of motion  - Assess patient's mobility; develop plan if impaired  - Assess patient's need for assistive devices and provide as appropriate  - Encourage maximum independence but intervene and supervise when necessary  - Involve family in performance of ADLs  - Assess for home care needs following discharge   - Consider OT consult to assist with ADL evaluation and planning for discharge  - Provide patient education as appropriate  Outcome: Progressing  Goal: Maintains/Returns to pre admission functional level  Description: INTERVENTIONS:  - Perform AM-PAC 6 Click Basic Mobility/ Daily Activity assessment daily.  - Set and communicate daily mobility goal to care team and patient/family/caregiver.   - Collaborate with rehabilitation services on mobility goals if consulted  - Out of bed for toileting  - Record patient progress and toleration of activity level   Outcome: Progressing     Problem: DISCHARGE PLANNING  Goal: Discharge to home or other facility with appropriate resources  Description: INTERVENTIONS:  - Identify barriers to discharge w/patient and caregiver  - Arrange for needed discharge resources and transportation as appropriate  - Identify discharge learning needs (meds, wound care, etc.)  - Arrange for interpretive services to assist at discharge as needed  - Refer to Case Management Department for coordinating discharge planning if the patient needs post-hospital services based on physician/advanced practitioner order or complex needs  related to functional status, cognitive ability, or social support system  Outcome: Progressing     Problem: Knowledge Deficit  Goal: Patient/family/caregiver demonstrates understanding of disease process, treatment plan, medications, and discharge instructions  Description: Complete learning assessment and assess knowledge base.  Interventions:  - Provide teaching at level of understanding  - Provide teaching via preferred learning methods  Outcome: Progressing

## 2024-08-10 NOTE — CONSULTS
GENERAL SURGERY CONSULT                                                                                                                                               Angelita Lam 64 y.o. 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 The patient received only 3 cycles of combination of carboplatin and paclitaxel, which was completed on 06/05/2015. She was lost to follow up and had her ileostomy reversed in 2016.         Pt did not tolerate having NGT placed last night, it could not be inserted. She has since stopped vomiting, she complains of bloating, no bowel movement, but she did pass gas.     -cont NPO with ice chips  -will hold off on NGT placement,   -cont with antiemetics  -cont with pain control  -cont to monitor bowel function, pain, exam and blood work        CHIEF COMPLAINT:  I started having abdominal pain last night with vomiting.     HPI: Angelita Lam is a 64 y.o. year oldfemale,PMH 2015 :History of Ovarian Ca, s/p Left Oophorectomy   a Stage IIC left ovarian carcinosarcoma s/p ELAP, modified posterior exenteration and tumor debulking along left pelvic sidewall, bladder dome, peritoneal stripping, left pelvic lymph node dissection and debulking, radical omentectomy, appy, loop ileostomy with Jocelyne Sweeney and Linnea on 03/18/2015. The patient received only 3 cycles of combination of carboplatin  "and paclitaxel, which was completed on 06/05/2015. She was lost to follow up and had her ileostomy reversed in 2016.     consulted for CT findings of SBO but can not exclude the possibility of ischemic bowel due to wall thickening and surrounding mesentery.   Pt  does not have a lactic acidosis and she she feels much better since being admitted,   Pt states 3 nights ago around 9 pm she had sudden onset of abdominal pain, she vomited \"30 times\" over the course of several hours. She felt bloated and did not pass gas or had a bowel movement.  The following day the pain had increased and she reported to the ED.  She did have a bowel movement yesterday morning. She complained of feeling very hot then very cold.  She denies blood or black bowel movements, hematemesis, urinary symptoms.      She now states she still have some nausea but no vomiting she continues to pass gas    Imaging Studies: CT abdomen pelvis with contrast    Result Date: 8/9/2024  Impression: Multiple abnormally dilated loops of proximal to mid small bowel with relative collapse of the distal small bowel compatible with a small bowel obstruction, likely high-grade. A few of these loops dilated small bowel demonstrate circumferential wall thickening with surrounding mesenteric edema for which early ischemia cannot be excluded. Small to moderate amount of intra-abdominal ascites is also present. Surgical consultation is advised. No free intraperitoneal air. Mild perivesicular inflammatory stranding suspicious for an acute cystitis/UTI. Correlation with a urinalysis is recommended. I personally discussed this study with CRYS RAMIREZ on 8/9/2024 10:21 PM. Workstation performed: [id number]     Lab Results:   Lab Results   Component Value Date    WBC 8.28 08/10/2024    HGB 14.0 08/10/2024    HCT 44.6 08/10/2024     08/10/2024     Lab Results   Component Value Date    K 3.8 08/10/2024    K 4.9 11/04/2022     08/10/2024     11/04/2022    CO2 " "24 08/10/2024    CO2 28 11/04/2022    BUN 18 08/10/2024    BUN 20 11/04/2022    CREATININE 0.97 08/10/2024    CREATININE 0.89 11/04/2022     Lab Results   Component Value Date    CALCIUM 7.7 (L) 08/10/2024    CALCIUM 9.2 11/04/2022    MG 1.8 (L) 08/10/2024    PHOS 3.4 08/10/2024     Lab Results   Component Value Date    AST 18 08/10/2024    AST 48 (H) 11/04/2022    ALT 15 08/10/2024    ALT 79 (H) 11/04/2022    ALKPHOS 79 08/10/2024    ALKPHOS 100 11/04/2022    TBILI 1.20 (H) 08/10/2024    TBILI 0.9 11/04/2022    ALB 3.1 (L) 08/10/2024    ALB 4.1 11/04/2022     No results found for: \"APTT\", \"INR\"    Inpatient consult to Acute Care Surgery  Consult performed by: Kathleen Russell PA-C  Consult ordered by: Osman Noel MD        Historical Information   Past Medical History:   Diagnosis Date    Ovarian cancer (HCC)      History reviewed. No pertinent surgical history.  Social History   Social History     Substance and Sexual Activity   Alcohol Use Not Currently     Social History     Substance and Sexual Activity   Drug Use Not Currently     Social History     Tobacco Use   Smoking Status Never   Smokeless Tobacco Never     Family History: non-contributory    Allergies   Allergen Reactions    Bee Venom Other (See Comments)     fever, spasms, pain       Meds/Allergies   current meds:   Current Facility-Administered Medications   Medication Dose Route Frequency    heparin (porcine) subcutaneous injection 5,000 Units  5,000 Units Subcutaneous Q8H [facility]    metoprolol (LOPRESSOR) injection 5 mg  5 mg Intravenous Q12H    morphine injection 2 mg  2 mg Intravenous Q4H PRN    morphine injection 4 mg  4 mg Intravenous Q4H PRN    multi-electrolyte (PLASMALYTE-A/ISOLYTE-S PH 7.4) IV solution  125 mL/hr Intravenous Continuous    ondansetron (ZOFRAN) injection 4 mg  4 mg Intravenous Q6H PRN              Objective   Vitals:    Intake/Output Summary (Last 24 hours) at 8/10/2024 1332  Last data filed at 8/10/2024 0900  Gross per 24 " "hour   Intake 1000 ml   Output --   Net 1000 ml     Invasive Devices       Peripheral Intravenous Line  Duration             Peripheral IV 08/09/24 Left Antecubital <1 day                    Review of Systems  12 point ROS reviewed and Negative except::   What is noted in the HPI    Physical Exam    Blood pressure 132/89, pulse 85, temperature 97.6 °F (36.4 °C), resp. rate 16, height 5' 8\" (1.727 m), weight 90.7 kg (199 lb 15.3 oz), SpO2 98%.  GEN: A & O x 3, cooperative   HEENT: PERRLA EOMI, sclera anicterus  NECK: supple   LUNGS: clear throughout  COR: RRR no murmur    ABD: soft, distended, tympanic, tenderness in low midline and lower quadrants.   No hernia, masses,     EXTREM: FROM no joint deformities.  No pedal edema   SKIN: no rashes or lesion   NEURO: CN II -XII intact, no tremor, affect appropriate            Kathleen Russell PA-C  8/10/2024  "

## 2024-08-11 LAB
ALBUMIN SERPL BCG-MCNC: 2.9 G/DL (ref 3.5–5)
ALP SERPL-CCNC: 66 U/L (ref 34–104)
ALT SERPL W P-5'-P-CCNC: 19 U/L (ref 7–52)
ANION GAP SERPL CALCULATED.3IONS-SCNC: 7 MMOL/L (ref 4–13)
AST SERPL W P-5'-P-CCNC: 38 U/L (ref 13–39)
BILIRUB SERPL-MCNC: 1.32 MG/DL (ref 0.2–1)
BUN SERPL-MCNC: 17 MG/DL (ref 5–25)
CALCIUM ALBUM COR SERPL-MCNC: 8.3 MG/DL (ref 8.3–10.1)
CALCIUM SERPL-MCNC: 7.4 MG/DL (ref 8.4–10.2)
CHLORIDE SERPL-SCNC: 105 MMOL/L (ref 96–108)
CO2 SERPL-SCNC: 27 MMOL/L (ref 21–32)
CREAT SERPL-MCNC: 0.82 MG/DL (ref 0.6–1.3)
ERYTHROCYTE [DISTWIDTH] IN BLOOD BY AUTOMATED COUNT: 12.7 % (ref 11.6–15.1)
GFR SERPL CREATININE-BSD FRML MDRD: 76 ML/MIN/1.73SQ M
GLUCOSE SERPL-MCNC: 82 MG/DL (ref 65–140)
HCT VFR BLD AUTO: 37.8 % (ref 34.8–46.1)
HGB BLD-MCNC: 11.6 G/DL (ref 11.5–15.4)
MAGNESIUM SERPL-MCNC: 2.4 MG/DL (ref 1.9–2.7)
MCH RBC QN AUTO: 30.2 PG (ref 26.8–34.3)
MCHC RBC AUTO-ENTMCNC: 30.7 G/DL (ref 31.4–37.4)
MCV RBC AUTO: 98 FL (ref 82–98)
PLATELET # BLD AUTO: 179 THOUSANDS/UL (ref 149–390)
PMV BLD AUTO: 10.4 FL (ref 8.9–12.7)
POTASSIUM SERPL-SCNC: 3.4 MMOL/L (ref 3.5–5.3)
PROT SERPL-MCNC: 5.9 G/DL (ref 6.4–8.4)
RBC # BLD AUTO: 3.84 MILLION/UL (ref 3.81–5.12)
SODIUM SERPL-SCNC: 139 MMOL/L (ref 135–147)
WBC # BLD AUTO: 5.02 THOUSAND/UL (ref 4.31–10.16)

## 2024-08-11 PROCEDURE — 80053 COMPREHEN METABOLIC PANEL: CPT | Performed by: STUDENT IN AN ORGANIZED HEALTH CARE EDUCATION/TRAINING PROGRAM

## 2024-08-11 PROCEDURE — 85027 COMPLETE CBC AUTOMATED: CPT | Performed by: STUDENT IN AN ORGANIZED HEALTH CARE EDUCATION/TRAINING PROGRAM

## 2024-08-11 PROCEDURE — 99232 SBSQ HOSP IP/OBS MODERATE 35: CPT | Performed by: SURGERY

## 2024-08-11 PROCEDURE — 83735 ASSAY OF MAGNESIUM: CPT | Performed by: STUDENT IN AN ORGANIZED HEALTH CARE EDUCATION/TRAINING PROGRAM

## 2024-08-11 PROCEDURE — 99232 SBSQ HOSP IP/OBS MODERATE 35: CPT | Performed by: STUDENT IN AN ORGANIZED HEALTH CARE EDUCATION/TRAINING PROGRAM

## 2024-08-11 RX ORDER — POTASSIUM CHLORIDE 1500 MG/1
40 TABLET, EXTENDED RELEASE ORAL ONCE
Status: COMPLETED | OUTPATIENT
Start: 2024-08-11 | End: 2024-08-11

## 2024-08-11 RX ORDER — TRAMADOL HYDROCHLORIDE 50 MG/1
50 TABLET ORAL EVERY 6 HOURS PRN
Status: DISCONTINUED | OUTPATIENT
Start: 2024-08-11 | End: 2024-08-13 | Stop reason: HOSPADM

## 2024-08-11 RX ORDER — OXYCODONE HYDROCHLORIDE 5 MG/1
5 TABLET ORAL EVERY 4 HOURS PRN
Status: DISCONTINUED | OUTPATIENT
Start: 2024-08-11 | End: 2024-08-13 | Stop reason: HOSPADM

## 2024-08-11 RX ADMIN — HEPARIN SODIUM 5000 UNITS: 5000 INJECTION INTRAVENOUS; SUBCUTANEOUS at 17:22

## 2024-08-11 RX ADMIN — TRAMADOL HYDROCHLORIDE 50 MG: 50 TABLET, COATED ORAL at 12:28

## 2024-08-11 RX ADMIN — POTASSIUM CHLORIDE 40 MEQ: 1500 TABLET, EXTENDED RELEASE ORAL at 09:32

## 2024-08-11 RX ADMIN — METOROPROLOL TARTRATE 5 MG: 5 INJECTION, SOLUTION INTRAVENOUS at 12:29

## 2024-08-11 RX ADMIN — HEPARIN SODIUM 5000 UNITS: 5000 INJECTION INTRAVENOUS; SUBCUTANEOUS at 09:33

## 2024-08-11 RX ADMIN — METOROPROLOL TARTRATE 5 MG: 5 INJECTION, SOLUTION INTRAVENOUS at 01:06

## 2024-08-11 RX ADMIN — ONDANSETRON 4 MG: 2 INJECTION INTRAMUSCULAR; INTRAVENOUS at 12:28

## 2024-08-11 RX ADMIN — HEPARIN SODIUM 5000 UNITS: 5000 INJECTION INTRAVENOUS; SUBCUTANEOUS at 01:06

## 2024-08-11 NOTE — UTILIZATION REVIEW
Initial Clinical Review    Admission: Date/Time/Statement:   Admission Orders (From admission, onward)       Ordered        08/10/24 0018  INPATIENT ADMISSION  Once                          Orders Placed This Encounter   Procedures    INPATIENT ADMISSION     Standing Status:   Standing     Number of Occurrences:   1     Order Specific Question:   Level of Care     Answer:   Med Surg [16]     Order Specific Question:   Estimated length of stay     Answer:   More than 2 Midnights     Order Specific Question:   Certification     Answer:   I certify that inpatient services are medically necessary for this patient for a duration of greater than two midnights. See H&P and MD Progress Notes for additional information about the patient's course of treatment.     ED Arrival Information       Expected   -    Arrival   8/9/2024 20:31    Acuity   Urgent              Means of arrival   Walk-In    Escorted by   Family Member    Service   Hospitalist    Admission type   Emergency              Arrival complaint   Abdominal Pain             Chief Complaint   Patient presents with    Vomiting     Pt got sick last night after dinner and hasn't been able to stop vomiting since then. Pt states she never checked her temperature but she had hot and cold flashes all night        Initial Presentation: 64 y.o. female to ED from home w/ PMHX ovarian cancer status post surgery back in 2015, hypertension comes in for nausea vomiting and onset of abdominal pain that happened yesterday. The pain continued getting worse and continued having worsening burping episodes. She is not passing any flatus. CT showing SBO . Admitted IP status w/ SBO plan for surgery consult , NPO , IVF , labs in am , NG tube . HTN lopressor .     Anticipated Length of Stay/Certification Statement:  Patient will be admitted on an Inpatient basis with an anticipated length of stay of greater than 2 midnights.   Justification for Hospital Stay: Please see detailed plans noted  above.    8/10 Surgery Consult   SBO , abd pain . Did not tolerate NGT placed last night , could not be inserted . Since stopped vomiting . C/o bloating . Did pass gas . Plan cont NPO , ice chips , hold off NGT placement , cont antiemetics , cont pain control , monitor bowel function .       Date: 8/11   Day 2: reports having episode of nausea w/o vomiting . Passing flatus . Has been ambulating . Bloating sensation and abd distention has improved . Plan to start clear liq diet .     ED Triage Vitals   Temperature Pulse Respirations Blood Pressure SpO2 Pain Score   08/09/24 2045 08/09/24 2045 08/09/24 2045 08/09/24 2045 08/09/24 2045 08/09/24 2341   98.3 °F (36.8 °C) (!) 128 20 166/95 98 % 10 - Worst Possible Pain     Weight (last 2 days)       Date/Time Weight    08/10/24 0139 90.7 (199.96)    08/09/24 2046 90.7 (200)            Vital Signs (last 3 days)       Date/Time Temp Pulse Resp BP MAP (mmHg) SpO2 O2 Device Patient Position - Orthostatic VS Pain    08/11/24 01:07:21 -- -- -- 134/76 95 -- -- -- --    08/10/24 23:51:35 98.8 °F (37.1 °C) -- -- 127/75 92 -- -- -- --    08/10/24 2100 -- -- -- -- -- -- None (Room air) -- No Pain    08/10/24 15:24:36 -- -- -- 123/75 91 -- -- -- --    08/10/24 12:21:19 -- -- -- 132/89 103 -- -- -- --    08/10/24 0805 -- -- -- -- -- -- -- -- No Pain    08/10/24 06:40:16 97.6 °F (36.4 °C) -- -- 127/86 100 -- -- -- --    08/10/24 0420 -- -- -- -- -- -- -- -- 9    08/10/24 0139 98.7 °F (37.1 °C) 85 16 -- -- -- -- Lying 4    08/10/24 01:35:11 98.7 °F (37.1 °C) -- -- 150/96 114 -- -- -- --    08/10/24 0114 -- 96 16 163/89 120 98 % None (Room air) Sitting --    08/10/24 0035 -- 105 16 140/101 115 95 % None (Room air) Lying --    08/09/24 2341 -- -- -- -- -- -- -- -- 10 - Worst Possible Pain    08/09/24 2215 -- 101 16 196/84 121 96 % None (Room air) Lying --    08/09/24 2130 -- 112 16 178/95 124 95 % None (Room air) Lying --    08/09/24 2115 -- 123 18 164/113 129 94 % None (Room air) Lying  --    08/09/24 2045 98.3 °F (36.8 °C) 128 20 166/95 125 98 % None (Room air) Sitting --              Pertinent Labs/Diagnostic Test Results:   Radiology:  CT abdomen pelvis with contrast   Final Interpretation by Guy Mckinley MD (08/09 2236)      Multiple abnormally dilated loops of proximal to mid small bowel with relative collapse of the distal small bowel compatible with a small bowel obstruction, likely high-grade. A few of these loops dilated small bowel demonstrate circumferential wall    thickening with surrounding mesenteric edema for which early ischemia cannot be excluded. Small to moderate amount of intra-abdominal ascites is also present. Surgical consultation is advised.      No free intraperitoneal air.      Mild perivesicular inflammatory stranding suspicious for an acute cystitis/UTI. Correlation with a urinalysis is recommended.               I personally discussed this study with CRYS RAMIREZ on 8/9/2024 10:21 PM.            Workstation performed: [id number]           Cardiology:  ECG 12 lead   Final Result by Harriet Perla MD (08/10 1613)   Sinus tachycardia   Possible Left atrial enlargement   Left axis deviation   Nonspecific T wave abnormality   Abnormal ECG   No previous ECGs available   Confirmed by Harriet Perla ([id number]) on 8/10/2024 4:13:03 PM        GI:  No orders to display       Results from last 7 days   Lab Units 08/09/24 2151   SARS-COV-2  Negative     Results from last 7 days   Lab Units 08/11/24  0527 08/10/24  0510 08/09/24 2114   WBC Thousand/uL 5.02 8.28 11.60*   HEMOGLOBIN g/dL 11.6 14.0 17.2*   HEMATOCRIT % 37.8 44.6 52.1*   PLATELETS Thousands/uL 179 240 281   TOTAL NEUT ABS Thousands/µL  --   --  10.24*         Results from last 7 days   Lab Units 08/11/24  0527 08/10/24  0510 08/09/24 2114   SODIUM mmol/L 139 139 137   POTASSIUM mmol/L 3.4* 3.8 4.5   CHLORIDE mmol/L 105 107 102   CO2 mmol/L 27 24 22   ANION GAP mmol/L 7 8 13   BUN mg/dL 17 18 19   CREATININE  mg/dL 0.82 0.97 1.11   EGFR ml/min/1.73sq m 76 62 52   CALCIUM mg/dL 7.4* 7.7* 8.9   MAGNESIUM mg/dL 2.4 1.8*  --    PHOSPHORUS mg/dL  --  3.4  --      Results from last 7 days   Lab Units 08/11/24  0527 08/10/24  0510 08/09/24  2114   AST U/L 38 18 26   ALT U/L 19 15 21   ALK PHOS U/L 66 79 106*   TOTAL PROTEIN g/dL 5.9* 6.2* 7.9   ALBUMIN g/dL 2.9* 3.1* 3.7   TOTAL BILIRUBIN mg/dL 1.32* 1.20* 1.48*         Results from last 7 days   Lab Units 08/11/24  0527 08/10/24  0510 08/09/24  2114   GLUCOSE RANDOM mg/dL 82 137 177*     Results from last 7 days   Lab Units 08/09/24  2241   LACTIC ACID mmol/L 1.8         Results from last 7 days   Lab Units 08/09/24  2114   LIPASE u/L 8*         Results from last 7 days   Lab Units 08/10/24  0334   CLARITY UA  Clear   COLOR UA  Light Orange   SPEC GRAV UA  >=1.050*   PH UA  6.0   GLUCOSE UA mg/dl Negative   KETONES UA mg/dl Trace*   BLOOD UA  Negative   PROTEIN UA mg/dl 30 (1+)*   NITRITE UA  Negative   BILIRUBIN UA  Negative   UROBILINOGEN UA (BE) mg/dl <2.0   LEUKOCYTES UA  Negative   WBC UA /hpf None Seen   RBC UA /hpf 1-2   BACTERIA UA /hpf Occasional   EPITHELIAL CELLS WET PREP /hpf Occasional   MUCUS THREADS  Occasional*     Results from last 7 days   Lab Units 08/09/24  2151   INFLUENZA A PCR  Negative   INFLUENZA B PCR  Negative   RSV PCR  Negative         ED Treatment-Medication Administration from 08/09/2024 2031 to 08/10/2024 0129         Date/Time Order Dose Route Action     08/09/2024 2115 ondansetron (ZOFRAN) injection 4 mg 4 mg Intravenous Given     08/09/2024 2115 sodium chloride 0.9 % bolus 1,000 mL 1,000 mL Intravenous New Bag     08/09/2024 2200 iohexol (OMNIPAQUE) 350 MG/ML injection (MULTI-DOSE) 100 mL 100 mL Intravenous Given     08/09/2024 2342 ondansetron (ZOFRAN) injection 4 mg 4 mg Intravenous Given     08/09/2024 2341 morphine injection 4 mg 4 mg Intravenous Given     08/10/2024 0033 sodium chloride 0.9 % bolus 1,000 mL 1,000 mL Intravenous New Bag      08/10/2024 0115 metoprolol (LOPRESSOR) injection 5 mg 5 mg Intravenous Given            Past Medical History:   Diagnosis Date    Ovarian cancer (HCC)      Present on Admission:   SBO (small bowel obstruction) (HCC)   Malignant neoplasm of left ovary (HCC)      Admitting Diagnosis: Vomiting [R11.10]  Small bowel obstruction (HCC) [K56.609]  SBO (small bowel obstruction) (HCC) [K56.609]  Abdominal pain [R10.9]  Nausea & vomiting [R11.2]  Age/Sex: 64 y.o. female  Admission Orders:  Scheduled Medications:  heparin (porcine), 5,000 Units, Subcutaneous, Q8H ABBIE  metoprolol, 5 mg, Intravenous, Q12H      Continuous IV Infusions:  multi-electrolyte, 125 mL/hr, Intravenous, Continuous      PRN Meds:  morphine injection, 2 mg, Intravenous, Q4H PRN  morphine injection, 4 mg, Intravenous, Q4H PRN  8/10 x1  ondansetron, 4 mg, Intravenous, Q6H PRN    I&O   Up and OOB   Act as daphne     IP CONSULT TO ACUTE CARE SURGERY  IP CONSULT TO ACUTE CARE SURGERY    Network Utilization Review Department  ATTENTION: Please call with any questions or concerns to [phone] and carefully listen to the prompts so that you are directed to the right person. All voicemails are confidential.   For Discharge needs, contact Care Management DC Support Team at [phone] opt. 2  Send all requests for admission clinical reviews, approved or denied determinations and any other requests to dedicated fax number below belonging to the campus where the patient is receiving treatment. List of dedicated fax numbers for the Facilities:  FACILITY NAME UR FAX NUMBER   ADMISSION DENIALS (Administrative/Medical Necessity) [phone]   DISCHARGE SUPPORT TEAM (NETWORK) [phone]   PARENT CHILD HEALTH (Maternity/NICU/Pediatrics) [phone]   [facility] [phone]   [facility] [phone]   [facility] [phone]   [facility] [phone]   [facility]  [facility] [phone]   [facility] [phone]   [facility] [phone]   [facility] [phone]   [facility] [phone]   Critical access hospital [phone]   [facility] [phone]   [facility] [phone]

## 2024-08-11 NOTE — ASSESSMENT & PLAN NOTE
Currently she is comfortable not in distress.  Does report passing flatus.  Patient is complaining of feeling nauseous however denies vomiting.  Reports that she has been ambulating quite a bit.  Discussed with surgery team and plan is to start on clear liquid trial.  Surgery recommendation appreciated.

## 2024-08-11 NOTE — PLAN OF CARE
Problem: INFECTION - ADULT  Goal: Absence or prevention of progression during hospitalization  Description: INTERVENTIONS:  - Assess and monitor for signs and symptoms of infection  - Monitor lab/diagnostic results  - Monitor all insertion sites, i.e. indwelling lines, tubes, and drains  - Monitor endotracheal if appropriate and nasal secretions for changes in amount and color  - [place] appropriate cooling/warming therapies per order  - Administer medications as ordered  - Instruct and encourage patient and family to use good hand hygiene technique  - Identify and instruct in appropriate isolation precautions for identified infection/condition  8/11/2024 1901 by Johana Cruz RN  Outcome: Progressing  8/11/2024 1901 by Johana Cruz RN  Outcome: Progressing  Goal: Absence of fever/infection during neutropenic period  Description: INTERVENTIONS:  - Monitor WBC    8/11/2024 1901 by Johana Cruz RN  Outcome: Progressing  8/11/2024 1901 by Johana Cruz RN  Outcome: Progressing

## 2024-08-11 NOTE — ASSESSMENT & PLAN NOTE
S/p surgery and chemotherapy.  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  Recommend continued patient follow-up with primary OB/GYN.

## 2024-08-11 NOTE — PROGRESS NOTES
"Progress Note - General Surgery   Angelita Lam 64 y.o. female MRN: [id number]  Unit/Bed#: -Bijal Encounter: [id number]    Assessment/Plan  SBO   Vomiting   Absominal Pain    VSS  WBC 5 from 11.6   Total bilirubin elevated 1.32, 1.20, 1.48   UO unmeasured x 2   Less abdominal pain and no bloating  Passed small amount of liquid stool       -advance to clear liquids   -cont to monitor blood work  -cont to monitor exam, bowel function     Chief Complaint: I have less pain, no bloating,       Objective/Exam: Blood pressure 134/73, pulse 70, temperature 98.7 °F (37.1 °C), temperature source Oral, resp. rate 18, height 5' 8\" (1.727 m), weight 90.7 kg (199 lb 15.3 oz), SpO2 98%.    Wound Culure: No results found for: \"WOUNDCULT\"      General Appearance:    Alert and orientated x 3, cooperative, no distress   Lungs:     Clear to auscultation bilaterally, respirations unlabored    Heart:    Regular rate and rhythm   Abdomen:     Soft, non tender throughout  NBS          Extremities:   Extremities normal,  no cyanosis or edema   Pulses:   2+ and symmetric all extremities, no calf tenderness   Skin:   Skin color, texture, turgor normal, no rashes or lesions   Neurologic:   CNII-XII intact, normal strength, sensation and reflexes     Throughout, affect appropriate       ,      Intake/Output Summary (Last 24 hours) at 8/11/2024 1228  Last data filed at 8/10/2024 1800  Gross per 24 hour   Intake 100 ml   Output --   Net 100 ml       Invasive Devices       Peripheral Intravenous Line  Duration             Peripheral IV 08/10/24 Right Antecubital <1 day                                            Labs: CBC with diff: @RESUFAST(WBC,HGB,HCT,MCV,PLT,ADJUSTEDWBC,   RBC,MCH,MCHC,RDW,MPV,NRBC,TOTALCELLSCOUNTED,SEGS%,GRANS%,LYMPHS%,EOS%,BASO%,ABNEUT,ABGRANS,ABLYMPHS,ABMOMOS,ABEOS,ABBASO)@,   BMP/CMP:  Lab Results   Component Value Date    K 3.4 (L) 08/11/2024    K 4.9 11/04/2022     08/11/2024     11/04/2022    CO2 27 " "08/11/2024    CO2 28 11/04/2022    BUN 17 08/11/2024    BUN 20 11/04/2022    CREATININE 0.82 08/11/2024    CREATININE 0.89 11/04/2022    CALCIUM 7.4 (L) 08/11/2024    CALCIUM 9.2 11/04/2022    AST 38 08/11/2024    AST 48 (H) 11/04/2022    ALT 19 08/11/2024    ALT 79 (H) 11/04/2022    ALKPHOS 66 08/11/2024    ALKPHOS 100 11/04/2022    EGFR 76 08/11/2024    EGFR 74 11/04/2022   ,   Lipid Panel: No results found for: \"CHOL\",   Coags: No results found for: \"PT\", \"PTT\", \"INR\",     Blood Culture: No results found for: \"BLOODCX\",   Urinalysis:   Lab Results   Component Value Date    COLORU Light Orange 08/10/2024    CLARITYU Clear 08/10/2024    SPECGRAV >=1.050 (H) 08/10/2024    PHUR 6.0 08/10/2024    LEUKOCYTESUR Negative 08/10/2024    NITRITE Negative 08/10/2024    GLUCOSEU Negative 08/10/2024    KETONESU Trace (A) 08/10/2024    BILIRUBINUR Negative 08/10/2024    BLOODU Negative 08/10/2024   ,   Urine Culture: No results found for: \"URINECX\",         Imaging: CT abdomen pelvis with contrast    Result Date: 8/9/2024  Impression: Multiple abnormally dilated loops of proximal to mid small bowel with relative collapse of the distal small bowel compatible with a small bowel obstruction, likely high-grade. A few of these loops dilated small bowel demonstrate circumferential wall thickening with surrounding mesenteric edema for which early ischemia cannot be excluded. Small to moderate amount of intra-abdominal ascites is also present. Surgical consultation is advised. No free intraperitoneal air. Mild perivesicular inflammatory stranding suspicious for an acute cystitis/UTI. Correlation with a urinalysis is recommended. I personally discussed this study with CRYS RAMIREZ on 8/9/2024 10:21 PM. Workstation performed: [id number]         Kathleen Russell PA-C  8/11/2024      "

## 2024-08-11 NOTE — PROGRESS NOTES
Former[facility]  Progress Note  Name: Angelita Lam I  MRN: [id number]  Unit/Bed#: -01 I Date of Admission: 8/9/2024   Date of Service: 8/11/2024 I Hospital Day: 1    Assessment & Plan   * SBO (small bowel obstruction) (HCC)  Assessment & Plan  Currently she is comfortable not in distress.  Does report passing flatus.  Patient is complaining of feeling nauseous however denies vomiting.  Reports that she has been ambulating quite a bit.  Discussed with surgery team and plan is to start on clear liquid trial.  Surgery recommendation appreciated.      Primary hypertension  Assessment & Plan  Currently blood pressure controlled.  Home regimen is Lopressor 12.5 mg p.o. twice daily,    Malignant neoplasm of left ovary (HCC)  Assessment & Plan  S/p surgery and chemotherapy.  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  Recommend continued patient follow-up with primary OB/GYN.               VTE Pharmacologic Prophylaxis: VTE Score: 2 Moderate Risk (Score 3-4) - Pharmacological DVT Prophylaxis Ordered: heparin.    Mobility:   Basic Mobility Inpatient Raw Score: 23  -HLM Goal: 7: Walk 25 feet or more  -HLM Achieved: 1: Laying in bed      Patient Centered Rounds: I performed bedside rounds with nursing staff today.   Discussions with Specialists or Other Care Team Provider: Surgery team    Total Time Spent on Date of Encounter in care of patient: 35 mins. This time was spent on one or more of the following: performing physical exam; counseling and coordination of care; obtaining or reviewing history; documenting in the medical record; reviewing/ordering tests, medications or procedures; communicating with other healthcare professionals and discussing with patient's family/caregivers.    Current Length of Stay: 1 day(s)  Current Patient Status: Inpatient   Certification Statement: The patient will continue to require additional inpatient hospital stay due to SBO improving  slowly.  Discharge Plan: Anticipate discharge in 24-48 hrs to home.    Code Status: Level 1 - Full Code    Subjective:   Seen during a.m. rounds.  Patient appears comfortable not in distress.  Does report having episode of nausea without vomiting.  Does report passing flatus.  Patient reports that she has been ambulating.  Patient reports that bloating sensation and abdominal distention has improved.  Agreeable to start on clear liquids trial.  No other events reported.    Objective:     Vitals:   Temp (24hrs), Av.8 °F (37.1 °C), Min:98.7 °F (37.1 °C), Max:98.8 °F (37.1 °C)    Temp:  [98.7 °F (37.1 °C)-98.8 °F (37.1 °C)] 98.7 °F (37.1 °C)  HR:  [70] 70  Resp:  [18] 18  BP: (123-146)/(73-80) 146/80  Body mass index is 30.4 kg/m².     Input and Output Summary (last 24 hours):     Intake/Output Summary (Last 24 hours) at 2024 1349  Last data filed at 8/10/2024 1800  Gross per 24 hour   Intake 100 ml   Output --   Net 100 ml       Physical Exam:   Physical Exam  Constitutional:       General: She is not in acute distress.     Appearance: Normal appearance. She is not ill-appearing, toxic-appearing or diaphoretic.   HENT:      Head: Normocephalic and atraumatic.   Eyes:      Pupils: Pupils are equal, round, and reactive to light.   Cardiovascular:      Rate and Rhythm: Normal rate.      Pulses: Normal pulses.   Pulmonary:      Effort: Pulmonary effort is normal. No respiratory distress.      Breath sounds: Normal breath sounds. No wheezing.   Abdominal:      General: Bowel sounds are normal. There is distension.      Palpations: Abdomen is soft.      Tenderness: There is no abdominal tenderness.   Musculoskeletal:      Right lower leg: No edema.      Left lower leg: No edema.   Neurological:      Mental Status: She is alert and oriented to person, place, and time.   Psychiatric:         Mood and Affect: Mood normal.         Behavior: Behavior normal.          Additional Data:     Labs:  Results from last 7 days    Lab Units 08/11/24  0527 08/10/24  0510 08/09/24  2114   WBC Thousand/uL 5.02   < > 11.60*   HEMOGLOBIN g/dL 11.6   < > 17.2*   HEMATOCRIT % 37.8   < > 52.1*   PLATELETS Thousands/uL 179   < > 281   SEGS PCT %  --   --  88*   LYMPHO PCT %  --   --  6*   MONO PCT %  --   --  6   EOS PCT %  --   --  0    < > = values in this interval not displayed.     Results from last 7 days   Lab Units 08/11/24  0527   SODIUM mmol/L 139   POTASSIUM mmol/L 3.4*   CHLORIDE mmol/L 105   CO2 mmol/L 27   BUN mg/dL 17   CREATININE mg/dL 0.82   ANION GAP mmol/L 7   CALCIUM mg/dL 7.4*   ALBUMIN g/dL 2.9*   TOTAL BILIRUBIN mg/dL 1.32*   ALK PHOS U/L 66   ALT U/L 19   AST U/L 38   GLUCOSE RANDOM mg/dL 82                 Results from last 7 days   Lab Units 08/09/24  2241   LACTIC ACID mmol/L 1.8       Lines/Drains:  Invasive Devices       Peripheral Intravenous Line  Duration             Peripheral IV 08/10/24 Right Antecubital <1 day                        Recent Cultures (last 7 days):         Last 24 Hours Medication List:   Current Facility-Administered Medications   Medication Dose Route Frequency Provider Last Rate    heparin (porcine)  5,000 Units Subcutaneous Q8H [facility] Osman Noel MD      metoprolol  5 mg Intravenous Q12H Osman Noel MD      multi-electrolyte  125 mL/hr Intravenous Continuous Osman Noel  mL/hr (08/10/24 0944)    ondansetron  4 mg Intravenous Q6H PRN Osman Noel MD      oxyCODONE  5 mg Oral Q4H PRN Kathleen Russell PA-C      traMADol  50 mg Oral Q6H PRN Kathleen Russell PA-C          Today, Patient Was Seen By: Antonio Rogers MD    **Please Note: This note may have been constructed using a voice recognition system.**

## 2024-08-12 LAB
ALBUMIN SERPL BCG-MCNC: 3.1 G/DL (ref 3.5–5)
ALP SERPL-CCNC: 68 U/L (ref 34–104)
ALT SERPL W P-5'-P-CCNC: 28 U/L (ref 7–52)
ANION GAP SERPL CALCULATED.3IONS-SCNC: 6 MMOL/L (ref 4–13)
AST SERPL W P-5'-P-CCNC: 53 U/L (ref 13–39)
BILIRUB SERPL-MCNC: 1.19 MG/DL (ref 0.2–1)
BUN SERPL-MCNC: 11 MG/DL (ref 5–25)
CALCIUM ALBUM COR SERPL-MCNC: 8.5 MG/DL (ref 8.3–10.1)
CALCIUM SERPL-MCNC: 7.8 MG/DL (ref 8.4–10.2)
CHLORIDE SERPL-SCNC: 104 MMOL/L (ref 96–108)
CO2 SERPL-SCNC: 27 MMOL/L (ref 21–32)
CREAT SERPL-MCNC: 0.72 MG/DL (ref 0.6–1.3)
GFR SERPL CREATININE-BSD FRML MDRD: 89 ML/MIN/1.73SQ M
GLUCOSE SERPL-MCNC: 96 MG/DL (ref 65–140)
POTASSIUM SERPL-SCNC: 3.7 MMOL/L (ref 3.5–5.3)
PROT SERPL-MCNC: 6.1 G/DL (ref 6.4–8.4)
SODIUM SERPL-SCNC: 137 MMOL/L (ref 135–147)

## 2024-08-12 PROCEDURE — 80053 COMPREHEN METABOLIC PANEL: CPT | Performed by: STUDENT IN AN ORGANIZED HEALTH CARE EDUCATION/TRAINING PROGRAM

## 2024-08-12 PROCEDURE — 99231 SBSQ HOSP IP/OBS SF/LOW 25: CPT

## 2024-08-12 PROCEDURE — 99232 SBSQ HOSP IP/OBS MODERATE 35: CPT | Performed by: STUDENT IN AN ORGANIZED HEALTH CARE EDUCATION/TRAINING PROGRAM

## 2024-08-12 RX ADMIN — METOROPROLOL TARTRATE 5 MG: 5 INJECTION, SOLUTION INTRAVENOUS at 01:56

## 2024-08-12 RX ADMIN — HEPARIN SODIUM 5000 UNITS: 5000 INJECTION INTRAVENOUS; SUBCUTANEOUS at 09:52

## 2024-08-12 RX ADMIN — Medication 12.5 MG: at 18:03

## 2024-08-12 RX ADMIN — HEPARIN SODIUM 5000 UNITS: 5000 INJECTION INTRAVENOUS; SUBCUTANEOUS at 01:56

## 2024-08-12 RX ADMIN — HEPARIN SODIUM 5000 UNITS: 5000 INJECTION INTRAVENOUS; SUBCUTANEOUS at 18:03

## 2024-08-12 NOTE — ASSESSMENT & PLAN NOTE
Currently she is comfortable not in distress.  Does report passing flatus.  Tolerated clear liquid well.  Denies nausea,vomiting, abdominal pain.   Diet was advanced to surgical soft after discussion with surgery team and plan was to tentative discharge home if tolerates diet well.      Reassessed after lunch and patient reports having some abdominal discomfort and nausea after meal however denies vomiting.  Does report passing flatus however no bowel movement yet.  Reports that she has been ambulating quite a bit.  Plan is to continue with surgical soft diet and tentative plan to discharge tomorrow if bowel function improves.  Patient and  both are in agreement with all plan.  Surgery recommendation appreciated.

## 2024-08-12 NOTE — PROGRESS NOTES
Count includes the [facility]  Progress Note  Name: Angelita Lam I  MRN: [id number]  Unit/Bed#: MS 329Frieda I Date of Admission: 8/9/2024   Date of Service: 8/12/2024 I Hospital Day: 2    Assessment & Plan   * SBO (small bowel obstruction) (HCC)  Assessment & Plan  Currently she is comfortable not in distress.  Does report passing flatus.  Tolerated clear liquid well.  Denies nausea,vomiting, abdominal pain.   Diet was advanced to surgical soft after discussion with surgery team and plan was to tentative discharge home if tolerates diet well.      Reassessed after lunch and patient reports having some abdominal discomfort and nausea after meal however denies vomiting.  Does report passing flatus however no bowel movement yet.  Reports that she has been ambulating quite a bit.  Plan is to continue with surgical soft diet and tentative plan to discharge tomorrow if bowel function improves.  Patient and  both are in agreement with all plan.  Surgery recommendation appreciated.      Primary hypertension  Assessment & Plan  Currently blood pressure controlled.  Home regimen is Lopressor 12.5 mg p.o. twice daily,    Malignant neoplasm of left ovary (HCC)  Assessment & Plan  S/p surgery and chemotherapy.  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  Recommend continued patient follow-up with primary OB/GYN.               VTE Pharmacologic Prophylaxis: VTE Score: 2 Moderate Risk (Score 3-4) - Pharmacological DVT Prophylaxis Ordered: heparin.    Mobility:   Basic Mobility Inpatient Raw Score: 24  JH-HLM Goal: 8: Walk 250 feet or more  JH-HLM Achieved: 8: Walk 250 feet ot more      Patient Centered Rounds: I performed bedside rounds with nursing staff today.   Discussions with Specialists or Other Care Team Provider: Surgery    Education and Discussions with Family / Patient: Updated  () at bedside.    Total Time Spent on Date of Encounter in care of patient: 35 mins.  This time was spent on one or more of the following: performing physical exam; counseling and coordination of care; obtaining or reviewing history; documenting in the medical record; reviewing/ordering tests, medications or procedures; communicating with other healthcare professionals and discussing with patient's family/caregivers.    Current Length of Stay: 2 day(s)  Current Patient Status: Inpatient   Certification Statement: The patient will continue to require additional inpatient hospital stay due to slowly advancing diet and bowel function improving slowly.  Discharge Plan: Anticipate discharge tomorrow to home.    Code Status: Level 1 - Full Code    Subjective:   Seen during a.m. rounds.  Patient appears comfortable nondistressed.  Reports overall feeling much better and eager to advance diet to surgical soft since she tolerated liquid diet well.  Denies nausea, vomiting.  Does report passing flatus however no bowel movement reported yet.  Patient also reports that he has been ambulating in hallway.  No other events reported.    Objective:     Vitals:   Temp (24hrs), Av.5 °F (36.9 °C), Min:97.9 °F (36.6 °C), Max:99 °F (37.2 °C)    Temp:  [97.9 °F (36.6 °C)-99 °F (37.2 °C)] 98.5 °F (36.9 °C)  BP: (128-136)/(70-85) 129/82  Body mass index is 30.4 kg/m².     Input and Output Summary (last 24 hours):     Intake/Output Summary (Last 24 hours) at 2024  Last data filed at 2024 0520  Gross per 24 hour   Intake 100 ml   Output --   Net 100 ml       Physical Exam:   Physical Exam  Constitutional:       General: She is not in acute distress.     Appearance: Normal appearance. She is not ill-appearing, toxic-appearing or diaphoretic.   HENT:      Head: Normocephalic and atraumatic.   Eyes:      Pupils: Pupils are equal, round, and reactive to light.   Cardiovascular:      Rate and Rhythm: Normal rate.      Pulses: Normal pulses.   Pulmonary:      Effort: Pulmonary effort is normal. No respiratory  distress.      Breath sounds: Normal breath sounds. No wheezing.   Abdominal:      General: Bowel sounds are normal. There is no distension.      Palpations: Abdomen is soft.      Tenderness: There is no abdominal tenderness.   Musculoskeletal:      Right lower leg: No edema.      Left lower leg: No edema.   Neurological:      Mental Status: She is alert and oriented to person, place, and time.   Psychiatric:         Mood and Affect: Mood normal.         Behavior: Behavior normal.          Additional Data:     Labs:  Results from last 7 days   Lab Units 08/11/24  0527 08/10/24  0510 08/09/24  2114   WBC Thousand/uL 5.02   < > 11.60*   HEMOGLOBIN g/dL 11.6   < > 17.2*   HEMATOCRIT % 37.8   < > 52.1*   PLATELETS Thousands/uL 179   < > 281   SEGS PCT %  --   --  88*   LYMPHO PCT %  --   --  6*   MONO PCT %  --   --  6   EOS PCT %  --   --  0    < > = values in this interval not displayed.     Results from last 7 days   Lab Units 08/12/24  0438   SODIUM mmol/L 137   POTASSIUM mmol/L 3.7   CHLORIDE mmol/L 104   CO2 mmol/L 27   BUN mg/dL 11   CREATININE mg/dL 0.72   ANION GAP mmol/L 6   CALCIUM mg/dL 7.8*   ALBUMIN g/dL 3.1*   TOTAL BILIRUBIN mg/dL 1.19*   ALK PHOS U/L 68   ALT U/L 28   AST U/L 53*   GLUCOSE RANDOM mg/dL 96                 Results from last 7 days   Lab Units 08/09/24  2241   LACTIC ACID mmol/L 1.8       Lines/Drains:  Invasive Devices       Peripheral Intravenous Line  Duration             Peripheral IV 08/10/24 Right Antecubital 2 days                            Recent Cultures (last 7 days):         Last 24 Hours Medication List:   Current Facility-Administered Medications   Medication Dose Route Frequency Provider Last Rate    heparin (porcine)  5,000 Units Subcutaneous Q8H [facility] Osman Noel MD      metoprolol tartrate  12.5 mg Oral BID Antonio SOLIS MD      ondansetron  4 mg Intravenous Q6H PRN Osman Noel MD      oxyCODONE  5 mg Oral Q4H PRN Kathleen Russell PA-C      traMADol  50  mg Oral Q6H PRN Kathleen Russell PA-C          Today, Patient Was Seen By: Antonio Rogers MD    **Please Note: This note may have been constructed using a voice recognition system.**

## 2024-08-12 NOTE — PROGRESS NOTES
"Progress Note - General Surgery   Angelita Lam 64 y.o. female MRN: [id number]  Unit/Bed#: -01 Encounter: [id number]    Assessment:  Angelita Lam is a 64 y.o. female with SBO.    AVSS, labs within normal limits    Plan:  Advance to soft diet as tolerated as patient is tolerating clear liquid diet and has bowel function  Discontinue IV fluids  Monitor abdominal exam, labs, vitals  PRN pain medication and anti-emetics  Encourage ambulation  DVT ppx: heparin  Incentive spirometry 10 times/hour while awake  Continue home medications as prescribed   Remainder of care per primary team-Slim -okay to discharge today from general surgery standpoint  General Surgery will sign off    Subjective/Objective    Subjective: No acute events overnight.     Objective:     Blood pressure 136/85, pulse 70, temperature 99 °F (37.2 °C), resp. rate 18, height 5' 8\" (1.727 m), weight 90.7 kg (199 lb 15.3 oz), SpO2 98%.,Body mass index is 30.4 kg/m².      Intake/Output Summary (Last 24 hours) at 8/12/2024 0831  Last data filed at 8/12/2024 0520  Gross per 24 hour   Intake 100 ml   Output --   Net 100 ml       Invasive Devices       Peripheral Intravenous Line  Duration             Peripheral IV 08/10/24 Right Antecubital 1 day                    Physical Exam:   GEN: NAD  HEENT: NCAT, MMM  CV: RRR, no m/r/g  Lung: Normal effort, CTA B/L, no w/r/r  Ab: Soft, ND, mildly TTP in the left mid abdomen  Extrem: No CCE   Neuro: A+Ox3     Lab, Imaging and other studies:I have personally reviewed pertinent lab results.     VTE Pharmacologic Prophylaxis: Heparin  VTE Mechanical Prophylaxis: sequential compression device    Recent Results (from the past 36 hour(s))   CBC and Platelet    Collection Time: 08/11/24  5:27 AM   Result Value Ref Range    WBC 5.02 4.31 - 10.16 Thousand/uL    RBC 3.84 3.81 - 5.12 Million/uL    Hemoglobin 11.6 11.5 - 15.4 g/dL    Hematocrit 37.8 34.8 - 46.1 %    MCV 98 82 - 98 fL    MCH 30.2 26.8 - 34.3 pg    MCHC " 30.7 (L) 31.4 - 37.4 g/dL    RDW 12.7 11.6 - 15.1 %    Platelets 179 149 - 390 Thousands/uL    MPV 10.4 8.9 - 12.7 fL   Comprehensive metabolic panel    Collection Time: 08/11/24  5:27 AM   Result Value Ref Range    Sodium 139 135 - 147 mmol/L    Potassium 3.4 (L) 3.5 - 5.3 mmol/L    Chloride 105 96 - 108 mmol/L    CO2 27 21 - 32 mmol/L    ANION GAP 7 4 - 13 mmol/L    BUN 17 5 - 25 mg/dL    Creatinine 0.82 0.60 - 1.30 mg/dL    Glucose 82 65 - 140 mg/dL    Calcium 7.4 (L) 8.4 - 10.2 mg/dL    Corrected Calcium 8.3 8.3 - 10.1 mg/dL    AST 38 13 - 39 U/L    ALT 19 7 - 52 U/L    Alkaline Phosphatase 66 34 - 104 U/L    Total Protein 5.9 (L) 6.4 - 8.4 g/dL    Albumin 2.9 (L) 3.5 - 5.0 g/dL    Total Bilirubin 1.32 (H) 0.20 - 1.00 mg/dL    eGFR 76 ml/min/1.73sq m   Magnesium    Collection Time: 08/11/24  5:27 AM   Result Value Ref Range    Magnesium 2.4 1.9 - 2.7 mg/dL   Comprehensive metabolic panel    Collection Time: 08/12/24  4:38 AM   Result Value Ref Range    Sodium 137 135 - 147 mmol/L    Potassium 3.7 3.5 - 5.3 mmol/L    Chloride 104 96 - 108 mmol/L    CO2 27 21 - 32 mmol/L    ANION GAP 6 4 - 13 mmol/L    BUN 11 5 - 25 mg/dL    Creatinine 0.72 0.60 - 1.30 mg/dL    Glucose 96 65 - 140 mg/dL    Calcium 7.8 (L) 8.4 - 10.2 mg/dL    Corrected Calcium 8.5 8.3 - 10.1 mg/dL    AST 53 (H) 13 - 39 U/L    ALT 28 7 - 52 U/L    Alkaline Phosphatase 68 34 - 104 U/L    Total Protein 6.1 (L) 6.4 - 8.4 g/dL    Albumin 3.1 (L) 3.5 - 5.0 g/dL    Total Bilirubin 1.19 (H) 0.20 - 1.00 mg/dL    eGFR 89 ml/min/1.73sq m

## 2024-08-13 VITALS
RESPIRATION RATE: 15 BRPM | SYSTOLIC BLOOD PRESSURE: 135 MMHG | HEIGHT: 68 IN | HEART RATE: 76 BPM | TEMPERATURE: 98.5 F | WEIGHT: 199.96 LBS | OXYGEN SATURATION: 96 % | BODY MASS INDEX: 30.31 KG/M2 | DIASTOLIC BLOOD PRESSURE: 80 MMHG

## 2024-08-13 LAB
ALBUMIN SERPL BCG-MCNC: 3 G/DL (ref 3.5–5)
ALP SERPL-CCNC: 68 U/L (ref 34–104)
ALT SERPL W P-5'-P-CCNC: 40 U/L (ref 7–52)
ANION GAP SERPL CALCULATED.3IONS-SCNC: 6 MMOL/L (ref 4–13)
AST SERPL W P-5'-P-CCNC: 58 U/L (ref 13–39)
BILIRUB SERPL-MCNC: 1 MG/DL (ref 0.2–1)
BUN SERPL-MCNC: 8 MG/DL (ref 5–25)
CALCIUM ALBUM COR SERPL-MCNC: 8.9 MG/DL (ref 8.3–10.1)
CALCIUM SERPL-MCNC: 8.1 MG/DL (ref 8.4–10.2)
CHLORIDE SERPL-SCNC: 106 MMOL/L (ref 96–108)
CO2 SERPL-SCNC: 28 MMOL/L (ref 21–32)
CREAT SERPL-MCNC: 0.7 MG/DL (ref 0.6–1.3)
ERYTHROCYTE [DISTWIDTH] IN BLOOD BY AUTOMATED COUNT: 12.3 % (ref 11.6–15.1)
GFR SERPL CREATININE-BSD FRML MDRD: 92 ML/MIN/1.73SQ M
GLUCOSE SERPL-MCNC: 104 MG/DL (ref 65–140)
HCT VFR BLD AUTO: 36 % (ref 34.8–46.1)
HGB BLD-MCNC: 11.2 G/DL (ref 11.5–15.4)
MAGNESIUM SERPL-MCNC: 2 MG/DL (ref 1.9–2.7)
MCH RBC QN AUTO: 30.3 PG (ref 26.8–34.3)
MCHC RBC AUTO-ENTMCNC: 31.1 G/DL (ref 31.4–37.4)
MCV RBC AUTO: 97 FL (ref 82–98)
PLATELET # BLD AUTO: 174 THOUSANDS/UL (ref 149–390)
PMV BLD AUTO: 10.9 FL (ref 8.9–12.7)
POTASSIUM SERPL-SCNC: 3.5 MMOL/L (ref 3.5–5.3)
PROT SERPL-MCNC: 5.8 G/DL (ref 6.4–8.4)
RBC # BLD AUTO: 3.7 MILLION/UL (ref 3.81–5.12)
SODIUM SERPL-SCNC: 140 MMOL/L (ref 135–147)
WBC # BLD AUTO: 3.5 THOUSAND/UL (ref 4.31–10.16)

## 2024-08-13 PROCEDURE — 80053 COMPREHEN METABOLIC PANEL: CPT | Performed by: STUDENT IN AN ORGANIZED HEALTH CARE EDUCATION/TRAINING PROGRAM

## 2024-08-13 PROCEDURE — 99239 HOSP IP/OBS DSCHRG MGMT >30: CPT | Performed by: INTERNAL MEDICINE

## 2024-08-13 PROCEDURE — 83735 ASSAY OF MAGNESIUM: CPT | Performed by: STUDENT IN AN ORGANIZED HEALTH CARE EDUCATION/TRAINING PROGRAM

## 2024-08-13 PROCEDURE — 85027 COMPLETE CBC AUTOMATED: CPT | Performed by: STUDENT IN AN ORGANIZED HEALTH CARE EDUCATION/TRAINING PROGRAM

## 2024-08-13 RX ADMIN — HEPARIN SODIUM 5000 UNITS: 5000 INJECTION INTRAVENOUS; SUBCUTANEOUS at 01:13

## 2024-08-13 RX ADMIN — Medication 12.5 MG: at 09:44

## 2024-08-13 NOTE — PLAN OF CARE
Problem: PAIN - ADULT  Goal: Verbalizes/displays adequate comfort level or baseline comfort level  Description: Interventions:  - Encourage patient to monitor pain and request assistance  - Assess pain using appropriate pain scale  - Administer analgesics based on type and severity of pain and evaluate response  - Implement non-pharmacological measures as appropriate and evaluate response  - Consider cultural and social influences on pain and pain management  - Notify physician/advanced practitioner if interventions unsuccessful or patient reports new pain  Outcome: Progressing     Problem: INFECTION - ADULT  Goal: Absence or prevention of progression during hospitalization  Description: INTERVENTIONS:  - Assess and monitor for signs and symptoms of infection  - Monitor lab/diagnostic results  - Monitor all insertion sites, i.e. indwelling lines, tubes, and drains  - Monitor endotracheal if appropriate and nasal secretions for changes in amount and color  - [place] appropriate cooling/warming therapies per order  - Administer medications as ordered  - Instruct and encourage patient and family to use good hand hygiene technique  - Identify and instruct in appropriate isolation precautions for identified infection/condition  Outcome: Progressing  Goal: Absence of fever/infection during neutropenic period  Description: INTERVENTIONS:  - Monitor WBC    Outcome: Progressing

## 2024-08-13 NOTE — DISCHARGE SUMMARY
Formerly [facility], later [facility]  Discharge- Angelita Lam 1960, 64 y.o. female MRN: [id number]  Unit/Bed#: [facility]01 Encounter: [id number]  Primary Care Provider: Smiley Andrade MD   Date and time admitted to hospital: 8/9/2024  9:08 PM    Discharge diagnosis:    Small bowel obstruction  History of ovarian cancer  Hypertension      Discharging Physician / Practitioner: Ze Fatima MD  PCP: Smiley Andrade MD  Admission Date:   Admission Orders (From admission, onward)       Ordered        08/10/24 0018  INPATIENT ADMISSION  Once                          Discharge Date: 08/13/24          Consultations During Hospital Stay:  Surgery    Significant Findings / Test Results:   CT abdomen pelvis with contrast [[id number]] Collected: 08/09/24 2213   Order Status: Completed Updated: 08/09/24 2238   Narrative:     CT ABDOMEN AND PELVIS WITH IV CONTRAST    INDICATION: abdominal pain and vomiting.    COMPARISON: None.    TECHNIQUE: CT examination of the abdomen and pelvis was performed. Multiplanar 2D reformatted images were created from the source data.    This examination, like all CT scans performed in the [facility], was performed utilizing techniques to minimize radiation dose exposure, including the use of iterative reconstruction and automated exposure control. Radiation dose length  product (DLP) for this visit: 731 mGy-cm    IV Contrast: 100 mL of iohexol (OMNIPAQUE)  Enteric Contrast: Not administered.    FINDINGS:    ABDOMEN    LOWER CHEST: There is trace right pleural fluid. There is a small hiatal hernia.    LIVER/BILIARY TREE: Hepatic steatosis. No suspicious mass. Normal hepatic contours. No biliary dilation.    GALLBLADDER: No calcified gallstones. No pericholecystic inflammatory change.    SPLEEN: Unremarkable.    PANCREAS: Unremarkable.    ADRENAL GLANDS: Unremarkable.    KIDNEYS/URETERS: No hydronephrosis or urinary tract calculi. Subcentimeter hypoattenuating renal  lesion(s), too small to characterize but statistically likely benign, which do not warrant follow-up (Radiology June 2019).    STOMACH AND BOWEL: Anastomotic suture material is noted at the level of the rectosigmoid junction.  There are multiple loops of abnormally dilated loops of proximal to mid small bowel with relative collapse of the distal small bowel and colon. A few loops of small bowel within the lower abdomen and pelvis demonstrate fecalization of its intraluminal  contents as well as circumferential wall thickening. The findings are most compatible with a small bowel obstruction, likely high-grade. A transition point appears to be within the left lower quadrant/pelvic region.    APPENDIX: No findings to suggest appendicitis.    ABDOMINOPELVIC CAVITY: There is a small to moderate amount of intra-abdominal ascites. No pneumoperitoneum. No lymphadenopathy.    VESSELS: Unremarkable for patient's age. An infrarenal IVC filter is noted.    PELVIS    REPRODUCTIVE ORGANS: Unremarkable for patient's age.    URINARY BLADDER: There is mild diffuse urinary bladder wall thickening with surrounding phonatory stranding suspicious for an acute cystitis/UTI.    ABDOMINAL WALL/INGUINAL REGIONS: Unremarkable.    BONES: No acute fracture or suspicious osseous lesion. Spinal degenerative changes.     Impression:       Multiple abnormally dilated loops of proximal to mid small bowel with relative collapse of the distal small bowel compatible with a small bowel obstruction, likely high-grade. A few of these loops dilated small bowel demonstrate circumferential wall  thickening with surrounding mesenteric edema for which early ischemia cannot be excluded. Small to moderate amount of intra-abdominal ascites is also present. Surgical consultation is advised.    No free intraperitoneal air.    Mild perivesicular inflammatory stranding suspicious for an acute cystitis/UTI. Correlation with a urinalysis is recommended.       "  Outpatient follow up Requested:  PCP    Complications:  None    Reason for Admission: SBO    HPI:  Angelita Lam is a 64 y.o. female who has past medical history significant for ovarian cancer status post surgery back in 2015, hypertension comes in for nausea vomiting and onset of abdominal pain that happened yesterday.  The pain continued getting worse and continued having worsening burping episodes.  She is not passing any flatus.  She was urged by her significant other to come to the hospital.  Every time she tried to eat or drink she would throw up.       Hospital Course:     The patient was hospitalized.  She was seen by general surgery.  Decision was made for conservative management of her bowel obstruction.  She started to pass gas and have bowel movements.  She was tolerating diet without any issues.  Patient currently without any emesis, no clinical signs of bowel obstruction.  She feels well with no issues to go home.  She is being discharged home in stable condition.  Oriented to stay well-hydrated.  She will follow-up with primary care physician    Condition at Discharge: good     Discharge Day Visit / Exam:     Subjective:    Patient evaluated this morning  Doing well.  Denies any chest pain nausea or vomiting.  Wants to go home  Vitals: Blood Pressure: 135/80 (08/13/24 0749)  Pulse: 76 (08/12/24 2155)  Temperature: 98.5 °F (36.9 °C) (08/12/24 2155)  Temp Source: Oral (08/11/24 0751)  Respirations: 15 (08/12/24 2155)  Height: 5' 8\" (172.7 cm) (08/10/24 0139)  Weight - Scale: 90.7 kg (199 lb 15.3 oz) (08/10/24 0139)  SpO2: 96 % (08/12/24 2155)    Exam:   Physical Exam  Vitals and nursing note reviewed.   Constitutional:       Appearance: Normal appearance. She is normal weight.      Comments: Female in bed awake   HENT:      Head: Normocephalic and atraumatic.      Right Ear: External ear normal.      Left Ear: External ear normal.      Nose: Nose normal. No congestion.      Mouth/Throat:      " Mouth: Mucous membranes are moist.      Pharynx: Oropharynx is clear. No oropharyngeal exudate or posterior oropharyngeal erythema.   Eyes:      General: No scleral icterus.        Right eye: No discharge.         Left eye: No discharge.      Extraocular Movements: Extraocular movements intact.      Conjunctiva/sclera: Conjunctivae normal.      Pupils: Pupils are equal, round, and reactive to light.   Cardiovascular:      Rate and Rhythm: Normal rate and regular rhythm.      Pulses: Normal pulses.      Heart sounds: Normal heart sounds. No murmur heard.     No friction rub. No gallop.   Pulmonary:      Effort: Pulmonary effort is normal. No respiratory distress.      Breath sounds: Normal breath sounds. No stridor. No wheezing, rhonchi or rales.   Chest:      Chest wall: No tenderness.   Abdominal:      General: Abdomen is flat. Bowel sounds are normal. There is no distension.      Palpations: Abdomen is soft. There is no mass.      Tenderness: There is no abdominal tenderness. There is no guarding or rebound.   Musculoskeletal:         General: No swelling, tenderness, deformity or signs of injury. Normal range of motion.      Cervical back: Normal range of motion and neck supple. No rigidity. No muscular tenderness.   Skin:     General: Skin is warm and dry.      Capillary Refill: Capillary refill takes less than 2 seconds.      Coloration: Skin is not jaundiced or pale.      Findings: No bruising, erythema, lesion or rash.   Neurological:      General: No focal deficit present.      Mental Status: She is alert and oriented to person, place, and time. Mental status is at baseline.      Cranial Nerves: No cranial nerve deficit.      Sensory: No sensory deficit.      Motor: No weakness.      Coordination: Coordination normal.   Psychiatric:         Mood and Affect: Mood normal.         Behavior: Behavior normal.         Thought Content: Thought content normal.         Judgment: Judgment normal.           Discussion  with Family: Patient - she did not request me to talk to anyone    Discharge instructions/Information to patient and family:   See after visit summary for information provided to patient and family.      Provisions for Follow-Up Care:  See after visit summary for information related to follow-up care and any pertinent home health orders.      Disposition:     Home    For Discharges to [facility]:   Not Applicable to this Patient - Not Applicable to this Patient    Planned Readmission: No     Discharge Statement:  I spent 90 minutes discharging the patient. This time was spent on the day of discharge. I had direct contact with the patient on the day of discharge. Greater than 50% of the total time was spent examining patient, answering all patient questions, arranging and discussing plan of care with patient as well as directly providing post-discharge instructions.  Additional time then spent on discharge activities.    Discharge Medications:  See after visit summary for reconciled discharge medications provided to patient and family.      ** Please Note: This note has been constructed using a voice recognition system **

## 2024-08-14 NOTE — UTILIZATION REVIEW
NOTIFICATION OF ADMISSION DISCHARGE   This is a Notification of Discharge from [facility]. Please be advised that this patient has been discharge from our facility. Below you will find the admission and discharge date and time including the patient’s disposition.   UTILIZATION REVIEW CONTACT:  Erum Rubin  Utilization   Network Utilization Review Department  Phone: [phone] x carefully listen to the prompts. All voicemails are confidential.  Email: [email or web address]     ADMISSION INFORMATION  PRESENTATION DATE: 8/9/2024  9:08 PM  OBERVATION ADMISSION DATE: N/A  INPATIENT ADMISSION DATE: 8/10/24 12:18 AM   DISCHARGE DATE: 8/13/2024 11:56 AM   DISPOSITION:Home/Self Care    Network Utilization Review Department  ATTENTION: Please call with any questions or concerns to [phone] and carefully listen to the prompts so that you are directed to the right person. All voicemails are confidential.   For Discharge needs, contact Care Management DC Support Team at [phone] opt. 2  Send all requests for admission clinical reviews, approved or denied determinations and any other requests to dedicated fax number below belonging to the campus where the patient is receiving treatment. List of dedicated fax numbers for the Facilities:  FACILITY NAME UR FAX NUMBER   ADMISSION DENIALS (Administrative/Medical Necessity) [phone]   DISCHARGE SUPPORT TEAM ([facility]) [phone]   PARENT CHILD HEALTH (Maternity/NICU/Pediatrics) [phone]   [facility] [phone]   [facility] [phone]   [facility] [phone]   [facility] [phone]   [facility] [phone]   [facility] [phone]   [facility] [phone]   [facility]  [phone]   [facility] [phone]   [facility] [phone]   [facility] [phone]   [facility] [phone]